# Patient Record
Sex: FEMALE | Race: WHITE | NOT HISPANIC OR LATINO | Employment: OTHER | URBAN - METROPOLITAN AREA
[De-identification: names, ages, dates, MRNs, and addresses within clinical notes are randomized per-mention and may not be internally consistent; named-entity substitution may affect disease eponyms.]

---

## 2021-01-28 ENCOUNTER — IMMUNIZATIONS (OUTPATIENT)
Dept: FAMILY MEDICINE CLINIC | Facility: HOSPITAL | Age: 86
End: 2021-01-28

## 2021-01-28 DIAGNOSIS — Z23 ENCOUNTER FOR IMMUNIZATION: Primary | ICD-10-CM

## 2021-01-28 PROCEDURE — 0011A SARS-COV-2 / COVID-19 MRNA VACCINE (MODERNA) 100 MCG: CPT

## 2021-01-28 PROCEDURE — 91301 SARS-COV-2 / COVID-19 MRNA VACCINE (MODERNA) 100 MCG: CPT

## 2021-02-25 ENCOUNTER — IMMUNIZATIONS (OUTPATIENT)
Dept: FAMILY MEDICINE CLINIC | Facility: HOSPITAL | Age: 86
End: 2021-02-25

## 2021-02-25 DIAGNOSIS — Z23 ENCOUNTER FOR IMMUNIZATION: Primary | ICD-10-CM

## 2021-02-25 PROCEDURE — 0012A SARS-COV-2 / COVID-19 MRNA VACCINE (MODERNA) 100 MCG: CPT

## 2021-02-25 PROCEDURE — 91301 SARS-COV-2 / COVID-19 MRNA VACCINE (MODERNA) 100 MCG: CPT

## 2022-01-14 ENCOUNTER — APPOINTMENT (EMERGENCY)
Dept: RADIOLOGY | Facility: HOSPITAL | Age: 87
DRG: 202 | End: 2022-01-14
Payer: COMMERCIAL

## 2022-01-14 ENCOUNTER — HOSPITAL ENCOUNTER (INPATIENT)
Facility: HOSPITAL | Age: 87
LOS: 2 days | Discharge: HOME | DRG: 202 | End: 2022-01-16
Attending: EMERGENCY MEDICINE | Admitting: INTERNAL MEDICINE
Payer: COMMERCIAL

## 2022-01-14 DIAGNOSIS — N39.0 UTI (URINARY TRACT INFECTION): ICD-10-CM

## 2022-01-14 DIAGNOSIS — R05.9 COUGH: Primary | ICD-10-CM

## 2022-01-14 DIAGNOSIS — J40 BRONCHITIS: ICD-10-CM

## 2022-01-14 DIAGNOSIS — R09.02 HYPOXIA: ICD-10-CM

## 2022-01-14 DIAGNOSIS — E87.6 HYPOKALEMIA: ICD-10-CM

## 2022-01-14 PROBLEM — R06.89 ACUTE RESPIRATORY INSUFFICIENCY: Status: ACTIVE | Noted: 2022-01-14

## 2022-01-14 PROBLEM — Z95.0 CARDIAC PACEMAKER IN SITU: Status: ACTIVE | Noted: 2022-01-14

## 2022-01-14 PROBLEM — N30.00 ACUTE CYSTITIS WITHOUT HEMATURIA: Status: ACTIVE | Noted: 2022-01-14

## 2022-01-14 PROBLEM — N30.90 CYSTITIS: Status: ACTIVE | Noted: 2022-01-14

## 2022-01-14 PROBLEM — I48.91 ATRIAL FIBRILLATION (HCC): Status: ACTIVE | Noted: 2022-01-14

## 2022-01-14 LAB
ALBUMIN SERPL BCP-MCNC: 3.3 G/DL (ref 3.5–5)
ALP SERPL-CCNC: 71 U/L (ref 46–116)
ALT SERPL W P-5'-P-CCNC: 32 U/L (ref 12–78)
ANION GAP SERPL CALCULATED.3IONS-SCNC: 10 MMOL/L (ref 4–13)
ANION GAP SERPL CALCULATED.3IONS-SCNC: 10 MMOL/L (ref 4–13)
APTT PPP: 51 SECONDS (ref 23–37)
AST SERPL W P-5'-P-CCNC: 23 U/L (ref 5–45)
ATRIAL RATE: 67 BPM
BACTERIA UR QL AUTO: ABNORMAL /HPF
BASOPHILS # BLD AUTO: 0.04 THOUSANDS/ΜL (ref 0–0.1)
BASOPHILS NFR BLD AUTO: 0 % (ref 0–1)
BILIRUB SERPL-MCNC: 0.6 MG/DL (ref 0.2–1)
BILIRUB UR QL STRIP: NEGATIVE
BUN SERPL-MCNC: 20 MG/DL (ref 5–25)
BUN SERPL-MCNC: 21 MG/DL (ref 5–25)
CALCIUM ALBUM COR SERPL-MCNC: 9.5 MG/DL (ref 8.3–10.1)
CALCIUM SERPL-MCNC: 8.9 MG/DL (ref 8.3–10.1)
CALCIUM SERPL-MCNC: 9 MG/DL (ref 8.3–10.1)
CARDIAC TROPONIN I PNL SERPL HS: 17 NG/L
CHLORIDE SERPL-SCNC: 100 MMOL/L (ref 100–108)
CHLORIDE SERPL-SCNC: 101 MMOL/L (ref 100–108)
CLARITY UR: ABNORMAL
CO2 SERPL-SCNC: 26 MMOL/L (ref 21–32)
CO2 SERPL-SCNC: 27 MMOL/L (ref 21–32)
COLOR UR: YELLOW
CREAT SERPL-MCNC: 1.13 MG/DL (ref 0.6–1.3)
CREAT SERPL-MCNC: 1.22 MG/DL (ref 0.6–1.3)
EOSINOPHIL # BLD AUTO: 0.01 THOUSAND/ΜL (ref 0–0.61)
EOSINOPHIL NFR BLD AUTO: 0 % (ref 0–6)
ERYTHROCYTE [DISTWIDTH] IN BLOOD BY AUTOMATED COUNT: 14.1 % (ref 11.6–15.1)
ERYTHROCYTE [DISTWIDTH] IN BLOOD BY AUTOMATED COUNT: 14.3 % (ref 11.6–15.1)
FLUAV RNA RESP QL NAA+PROBE: NEGATIVE
FLUBV RNA RESP QL NAA+PROBE: NEGATIVE
GFR SERPL CREATININE-BSD FRML MDRD: 39 ML/MIN/1.73SQ M
GFR SERPL CREATININE-BSD FRML MDRD: 43 ML/MIN/1.73SQ M
GLUCOSE SERPL-MCNC: 114 MG/DL (ref 65–140)
GLUCOSE SERPL-MCNC: 133 MG/DL (ref 65–140)
GLUCOSE UR STRIP-MCNC: NEGATIVE MG/DL
HCT VFR BLD AUTO: 37 % (ref 34.8–46.1)
HCT VFR BLD AUTO: 38.3 % (ref 34.8–46.1)
HGB BLD-MCNC: 11.5 G/DL (ref 11.5–15.4)
HGB BLD-MCNC: 12.2 G/DL (ref 11.5–15.4)
HGB UR QL STRIP.AUTO: ABNORMAL
IMM GRANULOCYTES # BLD AUTO: 0.06 THOUSAND/UL (ref 0–0.2)
IMM GRANULOCYTES NFR BLD AUTO: 1 % (ref 0–2)
INR PPP: 3.16 (ref 0.84–1.19)
INR PPP: 3.77 (ref 0.84–1.19)
KETONES UR STRIP-MCNC: NEGATIVE MG/DL
LACTATE SERPL-SCNC: 0.8 MMOL/L (ref 0.5–2)
LEUKOCYTE ESTERASE UR QL STRIP: ABNORMAL
LIPASE SERPL-CCNC: 124 U/L (ref 73–393)
LYMPHOCYTES # BLD AUTO: 0.85 THOUSANDS/ΜL (ref 0.6–4.47)
LYMPHOCYTES NFR BLD AUTO: 6 % (ref 14–44)
MCH RBC QN AUTO: 30.5 PG (ref 26.8–34.3)
MCH RBC QN AUTO: 30.6 PG (ref 26.8–34.3)
MCHC RBC AUTO-ENTMCNC: 31.1 G/DL (ref 31.4–37.4)
MCHC RBC AUTO-ENTMCNC: 31.9 G/DL (ref 31.4–37.4)
MCV RBC AUTO: 96 FL (ref 82–98)
MCV RBC AUTO: 98 FL (ref 82–98)
MONOCYTES # BLD AUTO: 0.97 THOUSAND/ΜL (ref 0.17–1.22)
MONOCYTES NFR BLD AUTO: 7 % (ref 4–12)
NEUTROPHILS # BLD AUTO: 11.3 THOUSANDS/ΜL (ref 1.85–7.62)
NEUTS SEG NFR BLD AUTO: 86 % (ref 43–75)
NITRITE UR QL STRIP: POSITIVE
NON-SQ EPI CELLS URNS QL MICRO: ABNORMAL /HPF
NRBC BLD AUTO-RTO: 0 /100 WBCS
NT-PROBNP SERPL-MCNC: 906 PG/ML
P AXIS: 48 DEGREES
PH UR STRIP.AUTO: 6 [PH]
PLATELET # BLD AUTO: 238 THOUSANDS/UL (ref 149–390)
PLATELET # BLD AUTO: 242 THOUSANDS/UL (ref 149–390)
PMV BLD AUTO: 9.3 FL (ref 8.9–12.7)
PMV BLD AUTO: 9.5 FL (ref 8.9–12.7)
POTASSIUM SERPL-SCNC: 3.1 MMOL/L (ref 3.5–5.3)
POTASSIUM SERPL-SCNC: 3.1 MMOL/L (ref 3.5–5.3)
PR INTERVAL: 206 MS
PROCALCITONIN SERPL-MCNC: 0.21 NG/ML
PROT SERPL-MCNC: 7.1 G/DL (ref 6.4–8.2)
PROT UR STRIP-MCNC: NEGATIVE MG/DL
PROTHROMBIN TIME: 31.5 SECONDS (ref 11.6–14.5)
PROTHROMBIN TIME: 36.1 SECONDS (ref 11.6–14.5)
QRS AXIS: -54 DEGREES
QRSD INTERVAL: 132 MS
QT INTERVAL: 436 MS
QTC INTERVAL: 460 MS
RBC # BLD AUTO: 3.76 MILLION/UL (ref 3.81–5.12)
RBC # BLD AUTO: 4 MILLION/UL (ref 3.81–5.12)
RBC #/AREA URNS AUTO: ABNORMAL /HPF
RSV RNA RESP QL NAA+PROBE: NEGATIVE
SARS-COV-2 RNA RESP QL NAA+PROBE: NEGATIVE
SODIUM SERPL-SCNC: 137 MMOL/L (ref 136–145)
SODIUM SERPL-SCNC: 137 MMOL/L (ref 136–145)
SP GR UR STRIP.AUTO: 1.02 (ref 1–1.03)
T WAVE AXIS: 16 DEGREES
UROBILINOGEN UR QL STRIP.AUTO: 0.2 E.U./DL
VENTRICULAR RATE: 67 BPM
WBC # BLD AUTO: 12.41 THOUSAND/UL (ref 4.31–10.16)
WBC # BLD AUTO: 13.23 THOUSAND/UL (ref 4.31–10.16)
WBC #/AREA URNS AUTO: ABNORMAL /HPF

## 2022-01-14 PROCEDURE — 85610 PROTHROMBIN TIME: CPT | Performed by: PHYSICIAN ASSISTANT

## 2022-01-14 PROCEDURE — 36415 COLL VENOUS BLD VENIPUNCTURE: CPT | Performed by: EMERGENCY MEDICINE

## 2022-01-14 PROCEDURE — 99223 1ST HOSP IP/OBS HIGH 75: CPT | Performed by: STUDENT IN AN ORGANIZED HEALTH CARE EDUCATION/TRAINING PROGRAM

## 2022-01-14 PROCEDURE — 94760 N-INVAS EAR/PLS OXIMETRY 1: CPT

## 2022-01-14 PROCEDURE — 87086 URINE CULTURE/COLONY COUNT: CPT | Performed by: EMERGENCY MEDICINE

## 2022-01-14 PROCEDURE — 93010 ELECTROCARDIOGRAM REPORT: CPT | Performed by: INTERNAL MEDICINE

## 2022-01-14 PROCEDURE — 0241U HB NFCT DS VIR RESP RNA 4 TRGT: CPT | Performed by: EMERGENCY MEDICINE

## 2022-01-14 PROCEDURE — 80053 COMPREHEN METABOLIC PANEL: CPT | Performed by: EMERGENCY MEDICINE

## 2022-01-14 PROCEDURE — 83880 ASSAY OF NATRIURETIC PEPTIDE: CPT | Performed by: EMERGENCY MEDICINE

## 2022-01-14 PROCEDURE — 84484 ASSAY OF TROPONIN QUANT: CPT | Performed by: EMERGENCY MEDICINE

## 2022-01-14 PROCEDURE — 87040 BLOOD CULTURE FOR BACTERIA: CPT | Performed by: EMERGENCY MEDICINE

## 2022-01-14 PROCEDURE — 71045 X-RAY EXAM CHEST 1 VIEW: CPT

## 2022-01-14 PROCEDURE — 99285 EMERGENCY DEPT VISIT HI MDM: CPT | Performed by: EMERGENCY MEDICINE

## 2022-01-14 PROCEDURE — 81001 URINALYSIS AUTO W/SCOPE: CPT | Performed by: EMERGENCY MEDICINE

## 2022-01-14 PROCEDURE — 99285 EMERGENCY DEPT VISIT HI MDM: CPT

## 2022-01-14 PROCEDURE — 83605 ASSAY OF LACTIC ACID: CPT | Performed by: EMERGENCY MEDICINE

## 2022-01-14 PROCEDURE — 93005 ELECTROCARDIOGRAM TRACING: CPT

## 2022-01-14 PROCEDURE — 80048 BASIC METABOLIC PNL TOTAL CA: CPT | Performed by: PHYSICIAN ASSISTANT

## 2022-01-14 PROCEDURE — 83690 ASSAY OF LIPASE: CPT | Performed by: EMERGENCY MEDICINE

## 2022-01-14 PROCEDURE — 85027 COMPLETE CBC AUTOMATED: CPT | Performed by: PHYSICIAN ASSISTANT

## 2022-01-14 PROCEDURE — 85025 COMPLETE CBC W/AUTO DIFF WBC: CPT | Performed by: EMERGENCY MEDICINE

## 2022-01-14 PROCEDURE — 94664 DEMO&/EVAL PT USE INHALER: CPT

## 2022-01-14 PROCEDURE — 85730 THROMBOPLASTIN TIME PARTIAL: CPT | Performed by: EMERGENCY MEDICINE

## 2022-01-14 PROCEDURE — 85610 PROTHROMBIN TIME: CPT | Performed by: EMERGENCY MEDICINE

## 2022-01-14 PROCEDURE — 84145 PROCALCITONIN (PCT): CPT | Performed by: PHYSICIAN ASSISTANT

## 2022-01-14 RX ORDER — SENNOSIDES 8.6 MG
1 TABLET ORAL
Status: DISCONTINUED | OUTPATIENT
Start: 2022-01-14 | End: 2022-01-16 | Stop reason: HOSPADM

## 2022-01-14 RX ORDER — ACETAMINOPHEN 325 MG/1
975 TABLET ORAL ONCE
Status: COMPLETED | OUTPATIENT
Start: 2022-01-14 | End: 2022-01-14

## 2022-01-14 RX ORDER — ONDANSETRON 2 MG/ML
4 INJECTION INTRAMUSCULAR; INTRAVENOUS EVERY 6 HOURS PRN
Status: DISCONTINUED | OUTPATIENT
Start: 2022-01-14 | End: 2022-01-16 | Stop reason: HOSPADM

## 2022-01-14 RX ORDER — CHLORTHALIDONE 25 MG/1
25 TABLET ORAL DAILY
COMMUNITY

## 2022-01-14 RX ORDER — CEFTRIAXONE 1 G/50ML
1000 INJECTION, SOLUTION INTRAVENOUS ONCE
Status: COMPLETED | OUTPATIENT
Start: 2022-01-14 | End: 2022-01-14

## 2022-01-14 RX ORDER — POTASSIUM CHLORIDE 20 MEQ/1
40 TABLET, EXTENDED RELEASE ORAL
Status: DISPENSED | OUTPATIENT
Start: 2022-01-14 | End: 2022-01-15

## 2022-01-14 RX ORDER — LORAZEPAM 1 MG/1
0.5 TABLET ORAL DAILY PRN
COMMUNITY

## 2022-01-14 RX ORDER — LORAZEPAM 0.5 MG/1
0.5 TABLET ORAL DAILY PRN
Status: DISCONTINUED | OUTPATIENT
Start: 2022-01-14 | End: 2022-01-16 | Stop reason: HOSPADM

## 2022-01-14 RX ORDER — WARFARIN SODIUM 5 MG/1
2.5 TABLET ORAL
Status: DISCONTINUED | OUTPATIENT
Start: 2022-01-14 | End: 2022-01-14

## 2022-01-14 RX ORDER — BISOPROLOL FUMARATE 5 MG/1
5 TABLET ORAL DAILY
Status: DISCONTINUED | OUTPATIENT
Start: 2022-01-14 | End: 2022-01-16 | Stop reason: HOSPADM

## 2022-01-14 RX ORDER — CHLORTHALIDONE 25 MG/1
25 TABLET ORAL DAILY
Status: DISCONTINUED | OUTPATIENT
Start: 2022-01-14 | End: 2022-01-16 | Stop reason: HOSPADM

## 2022-01-14 RX ORDER — BENZONATATE 100 MG/1
100 CAPSULE ORAL 3 TIMES DAILY PRN
Status: DISCONTINUED | OUTPATIENT
Start: 2022-01-14 | End: 2022-01-16 | Stop reason: HOSPADM

## 2022-01-14 RX ORDER — ACETAMINOPHEN 325 MG/1
650 TABLET ORAL EVERY 6 HOURS PRN
Status: DISCONTINUED | OUTPATIENT
Start: 2022-01-14 | End: 2022-01-16 | Stop reason: HOSPADM

## 2022-01-14 RX ORDER — CEFTRIAXONE 1 G/50ML
1000 INJECTION, SOLUTION INTRAVENOUS EVERY 24 HOURS
Status: DISCONTINUED | OUTPATIENT
Start: 2022-01-15 | End: 2022-01-15

## 2022-01-14 RX ORDER — MULTIVIT-MIN/IRON/FOLIC ACID/K 18-600-40
500 CAPSULE ORAL DAILY
COMMUNITY

## 2022-01-14 RX ORDER — PRAVASTATIN SODIUM 20 MG
20 TABLET ORAL DAILY
COMMUNITY

## 2022-01-14 RX ORDER — CLOPIDOGREL BISULFATE 75 MG/1
75 TABLET ORAL DAILY
COMMUNITY

## 2022-01-14 RX ORDER — CLOPIDOGREL BISULFATE 75 MG/1
75 TABLET ORAL DAILY
Status: DISCONTINUED | OUTPATIENT
Start: 2022-01-14 | End: 2022-01-16 | Stop reason: HOSPADM

## 2022-01-14 RX ORDER — PRAVASTATIN SODIUM 20 MG
20 TABLET ORAL
Status: DISCONTINUED | OUTPATIENT
Start: 2022-01-14 | End: 2022-01-16 | Stop reason: HOSPADM

## 2022-01-14 RX ORDER — BENZONATATE 100 MG/1
100 CAPSULE ORAL ONCE
Status: COMPLETED | OUTPATIENT
Start: 2022-01-14 | End: 2022-01-14

## 2022-01-14 RX ORDER — AMLODIPINE BESYLATE 5 MG/1
5 TABLET ORAL DAILY
Status: DISCONTINUED | OUTPATIENT
Start: 2022-01-14 | End: 2022-01-16 | Stop reason: HOSPADM

## 2022-01-14 RX ORDER — WARFARIN SODIUM 2.5 MG/1
5 TABLET ORAL
COMMUNITY

## 2022-01-14 RX ORDER — POTASSIUM CHLORIDE 20 MEQ/1
40 TABLET, EXTENDED RELEASE ORAL ONCE
Status: COMPLETED | OUTPATIENT
Start: 2022-01-14 | End: 2022-01-14

## 2022-01-14 RX ORDER — AMLODIPINE BESYLATE 5 MG/1
5 TABLET ORAL DAILY
COMMUNITY

## 2022-01-14 RX ORDER — CALCIUM CARBONATE 200(500)MG
1000 TABLET,CHEWABLE ORAL DAILY PRN
Status: DISCONTINUED | OUTPATIENT
Start: 2022-01-14 | End: 2022-01-16 | Stop reason: HOSPADM

## 2022-01-14 RX ADMIN — CEFTRIAXONE 1000 MG: 1 INJECTION, SOLUTION INTRAVENOUS at 02:37

## 2022-01-14 RX ADMIN — BENZONATATE 100 MG: 100 CAPSULE ORAL at 01:01

## 2022-01-14 RX ADMIN — LORAZEPAM 0.5 MG: 0.5 TABLET ORAL at 09:30

## 2022-01-14 RX ADMIN — CHLORTHALIDONE 25 MG: 25 TABLET ORAL at 09:30

## 2022-01-14 RX ADMIN — BISOPROLOL FUMARATE 5 MG: 5 TABLET ORAL at 09:30

## 2022-01-14 RX ADMIN — POTASSIUM CHLORIDE 40 MEQ: 1500 TABLET, EXTENDED RELEASE ORAL at 01:28

## 2022-01-14 RX ADMIN — CLOPIDOGREL BISULFATE 75 MG: 75 TABLET ORAL at 09:28

## 2022-01-14 RX ADMIN — ACETAMINOPHEN 975 MG: 325 TABLET, FILM COATED ORAL at 01:01

## 2022-01-14 RX ADMIN — AMLODIPINE BESYLATE 5 MG: 5 TABLET ORAL at 09:30

## 2022-01-14 RX ADMIN — CALCIUM CARBONATE (ANTACID) CHEW TAB 500 MG 1000 MG: 500 CHEW TAB at 09:28

## 2022-01-14 RX ADMIN — PRAVASTATIN SODIUM 20 MG: 20 TABLET ORAL at 17:38

## 2022-01-14 RX ADMIN — POTASSIUM CHLORIDE 40 MEQ: 1500 TABLET, EXTENDED RELEASE ORAL at 16:52

## 2022-01-14 RX ADMIN — POTASSIUM CHLORIDE 40 MEQ: 1500 TABLET, EXTENDED RELEASE ORAL at 09:29

## 2022-01-14 NOTE — H&P
[facility] 1933, 80 [facility]  [facility] MRN: [id number]  Unit/Bed#: ED 08 Encounter: [id number]  Primary Care Provider: Duane Barry, MD   Date and time admitted to hospital: 1/14/2022 12:02 AM    * Cystitis  Assessment & Plan  · Reports burning after urination   · UA: 30-50 WBCs, moderate bacteria, and positive nitrite   · Urine culture, pending   · Febrile to 100 8F in ED with leukocytosis at 13  23K  · Continue ceftriaxone     Acute respiratory insufficiency  Assessment & Plan  · SpO2 on RA 88-90%   · SpO2 improve to 93-97% on 2L NC  · Turned down to 0 5L on admission with SpO2 of 92%   · COVID 19 PCR negative  · Wean as able   · Likely secondary to acute bronchitis     Bronchitis  Assessment & Plan  · Worsening cough over the last 2 weeks   · Treated with azithromycin with last dose on Monday with mild improvement in cough   · CXR without infiltrate on my review, final read pending   · Continue tessalon perles TID     Hypertension  Assessment & Plan  · Home regimen: amlodipine 5mg daily, chlorthalidone 25mg daily, and bisoprolol 10mg daily   · Continue     Cardiac pacemaker in situ  Assessment & Plan  · Placed in September 2021 at [facility]     Atrial fibrillation [facility])  Assessment & Plan  · Home regimen: bisoprolol 5 mg daily and anticoagulation with coumadin 5mg daily except Monday 2 5mg   · INR 3 77 this AM - will hold coumadin this evening     VTE Pharmacologic Prophylaxis: VTE Score: 4 Moderate Risk (Score 3-4) - Pharmacological DVT Prophylaxis Ordered: heparin  Code Status: Level 1 - Full Code   Discussion with family: Updated  (daughter) via phone  Anticipated Length of Stay: Patient will be admitted on an inpatient basis with an anticipated length of stay of greater than 2 midnights secondary to Acute respiratory insufficiency, bronchitis      Total Time for Visit, including Counseling / Coordination of Care: 45 minutes Greater than 50% of this total time spent on direct patient counseling and coordination of care  Chief Complaint: "I have this terrible cough"    History of Present Illness:  Sneha Shukla is a 80 y o  female with a PMH of atrial fibrillation on Coumadin, pacemaker placement, HTN, and recent pneumonia in November 2021 who presents with worsening cough x2 weeks  Reports treatment with azithromycin for pneumonia with improvement in symptoms  Two weeks ago she developed a productive cough of yellow phlegm  She was again treated with azithromycin with last dose 4 days ago with mild improvement in cough  No intake of cough suppressants  Reports some chills at home, but no fever  Denies dyspnea, abdominal pain, nausea, vomiting, or change in bowel habits  She does endorse burning after urination for a couple of days this last week  Daughter reports change in mental status yesterday, described as using a tissue as a tv remote  Review of Systems:  Review of Systems   Constitutional: Positive for chills  Negative for fever  HENT: Negative for congestion and sore throat  Respiratory: Positive for cough  Negative for shortness of breath  Cardiovascular: Negative for chest pain, palpitations and leg swelling  Gastrointestinal: Negative for abdominal pain, constipation, diarrhea, nausea and vomiting  Genitourinary: Positive for dysuria  Negative for difficulty urinating and hematuria  Musculoskeletal: Positive for gait problem (uses cane)  Neurological: Negative for weakness and numbness  All other systems reviewed and are negative        Past Medical and Surgical History:   Past Medical History:   Diagnosis Date    Ambulatory dysfunction 1/25/2016    Anxiety     Arthritis     Basal cell carcinoma     BBB (bundle branch block)     Hyperlipidemia     Hypertension     Hypoglycemia     Hypoglycemia     Right knee pain        Past Surgical History:   Procedure Laterality Date    BOWEL RESECTION  CHOLECYSTECTOMY      HYSTERECTOMY      RESECTION SMALL BOWEL / CLOSURE ILEOSTOMY         Meds/Allergies:  Prior to Admission medications    Medication Sig Start Date End Date Taking? Authorizing Provider   acetaminophen (TYLENOL) 325 mg tablet Take 2 tablets (650 mg total) by mouth 3 (three) times a day as needed for mild pain  1/25/16   Rajan Tsang MD   bisoprolol (ZEBETA) 10 MG tablet Take 10 mg by mouth daily Indications: High Blood Pressure  Historical Provider, MD   lisinopril (ZESTRIL) 5 mg tablet Take 5 mg by mouth daily  Historical Provider, MD LOZANO have reviewed home medications with patient personally  Allergies: Allergies   Allergen Reactions    Codeine     Penicillins     Streptomycin        Social History:  Marital Status:    Occupation: retired   Patient Pre-hospital Living Situation: Home, With other family member: daughter  Patient Pre-hospital Level of Mobility: walks with cane  Patient Pre-hospital Diet Restrictions: none   Substance Use History:   Social History     Substance and Sexual Activity   Alcohol Use No     Social History     Tobacco Use   Smoking Status Former Smoker   Smokeless Tobacco Never Used     Social History     Substance and Sexual Activity   Drug Use No       Family History:  Family History   Problem Relation Age of Onset    Stroke Mother        Physical Exam:     Vitals:   Blood Pressure: 110/52 (01/14/22 0500)  Pulse: 60 (01/14/22 0500)  Temperature: 98 3 °F (36 8 °C) (01/14/22 0432)  Temp Source: Oral (01/14/22 0432)  Respirations: 20 (01/14/22 0500)  Height: 5' 6" (167 6 cm) (01/14/22 0010)  Weight - Scale: 69 9 kg (154 lb) (01/14/22 0010)  SpO2: 92 % (01/14/22 0500)    Physical Exam  Vitals and nursing note reviewed  Constitutional:       Appearance: Normal appearance  HENT:      Head: Normocephalic        Nose: Nose normal       Mouth/Throat:      Mouth: Mucous membranes are moist    Eyes:      Extraocular Movements: Extraocular movements intact  Conjunctiva/sclera: Conjunctivae normal    Cardiovascular:      Rate and Rhythm: Normal rate and regular rhythm  Pulses: Normal pulses  Heart sounds: No murmur heard  Pulmonary:      Effort: Pulmonary effort is normal       Breath sounds: Normal breath sounds  Comments: Wet sounding productive cough  Abdominal:      General: Abdomen is flat  Palpations: Abdomen is soft  Tenderness: There is no abdominal tenderness  There is no guarding or rebound  Musculoskeletal:         General: Normal range of motion  Cervical back: Normal range of motion  Right lower leg: No edema  Left lower leg: No edema  Skin:     General: Skin is warm and dry  Neurological:      General: No focal deficit present  Mental Status: She is alert  Comments: Oriented to self, place, month (after prompting), year, and president  Psychiatric:         Mood and Affect: Mood normal          Thought Content:  Thought content normal           Additional Data:     Lab Results:  Results from last 7 days   Lab Units 01/14/22  0044   WBC Thousand/uL 13 23*   HEMOGLOBIN g/dL 12 2   HEMATOCRIT % 38 3   PLATELETS Thousands/uL 242   NEUTROS PCT % 86*   LYMPHS PCT % 6*   MONOS PCT % 7   EOS PCT % 0     Results from last 7 days   Lab Units 01/14/22  0044   SODIUM mmol/L 137   POTASSIUM mmol/L 3 1*   CHLORIDE mmol/L 100   CO2 mmol/L 27   BUN mg/dL 20   CREATININE mg/dL 1 13   ANION GAP mmol/L 10   CALCIUM mg/dL 8 9   ALBUMIN g/dL 3 3*   TOTAL BILIRUBIN mg/dL 0 60   ALK PHOS U/L 71   ALT U/L 32   AST U/L 23   GLUCOSE RANDOM mg/dL 133     Results from last 7 days   Lab Units 01/14/22  0044   INR  3 16*             Results from last 7 days   Lab Units 01/14/22  0231   LACTIC ACID mmol/L 0 8       Imaging: Personally reviewed the following imaging: chest xray  XR chest portable   ED Interpretation by King Goodson DO (01/14 0144)   No lobar infiltrate          EKG and Other Studies Reviewed on Admission:   · EKG: NSR  HR 67 bpm   Normal QT interval   Right bundle-branch block  LVH  No acute signs of ischemia       ** Please Note: This note has been constructed using a voice recognition system   **

## 2022-01-14 NOTE — ASSESSMENT & PLAN NOTE
· Worsening cough over the last 2 weeks   · Treated with azithromycin with last dose on Monday with mild improvement in cough   · CXR no acute cardiopulmonary disease  · Continue tessalon perles TID

## 2022-01-14 NOTE — ASSESSMENT & PLAN NOTE
· SpO2 on RA 88-90%   · SpO2 improve to 93-97% on 2L NC-> wean down to room air  · COVID 19 PCR negative  · Likely secondary to acute bronchitis   · Resp protocol  · Incentive spirometry  · Acapella

## 2022-01-14 NOTE — Clinical Note
Case was discussed with EMERALD and the patient's admission status was agreed to be Admission Status: inpatient status to the service of Dr Darren Ko

## 2022-01-14 NOTE — ASSESSMENT & PLAN NOTE
· Reports burning after urination   · UA: 30-50 WBCs, moderate bacteria, and positive nitrite   · Urine culture, pending   · Febrile to 100 8F in ED with leukocytosis at 13  23K  · Continue ceftriaxone

## 2022-01-14 NOTE — RESPIRATORY THERAPY NOTE
RT Protocol Note  Reed Fung 80 y o  female MRN: [id number]  Unit/Bed#: -01 Encounter: [id number]    Assessment    Principal Problem:    Acute cystitis without hematuria  Active Problems:    Hypertension    Bronchitis    Cardiac pacemaker in situ    Atrial fibrillation ([address] )    Acute respiratory insufficiency      Home Pulmonary Medications:  none       Past Medical History:   Diagnosis Date    Ambulatory dysfunction 1/25/2016    Anxiety     Arthritis     Basal cell carcinoma     BBB (bundle branch block)     Hyperlipidemia     Hypertension     Hypoglycemia     Hypoglycemia     Right knee pain      Social History     Socioeconomic History    Marital status:      Spouse name: None    Number of children: None    Years of education: None    Highest education level: None   Occupational History    None   Tobacco Use    Smoking status: Former Smoker    Smokeless tobacco: Never Used   Vaping Use    Vaping Use: Never used   Substance and Sexual Activity    Alcohol use: No    Drug use: No    Sexual activity: None   Other Topics Concern    None   Social History Narrative    None     Social Determinants of Health     Financial Resource Strain: Not on file   Food Insecurity: Not on file   Transportation Needs: Not on file   Physical Activity: Not on file   Stress: Not on file   Social Connections: Not on file   Intimate Partner Violence: Not on file   Housing Stability: Not on file       Subjective         Objective    Physical Exam:   Assessment Type: Assess only  General Appearance: Alert,Awake  Respiratory Pattern: Normal  Chest Assessment: Chest expansion symmetrical  Bilateral Breath Sounds: Diminished,Coarse  Cough: Non-productive    Vitals:  Blood pressure 129/56, pulse 60, temperature 98 9 °F (37 2 °C), temperature source Oral, resp  rate 20, height 5' 6" (1 676 m), weight 69 9 kg (154 lb), SpO2 93 %, not currently breastfeeding            Imaging and other studies: I have personally reviewed pertinent reports              Plan    Respiratory Plan: No distress/Pulmonary history        Resp Comments: D/C protocol

## 2022-01-14 NOTE — ASSESSMENT & PLAN NOTE
· Home regimen: bisoprolol 5 mg daily and anticoagulation with coumadin 5mg daily except Monday 2 5mg   · INR 3 84 this AM - hold coumadin today

## 2022-01-14 NOTE — PLAN OF CARE
Problem: Potential for Falls  Goal: Patient will remain free of falls  Description: INTERVENTIONS:  - Educate patient/family on patient safety including physical limitations  - Instruct patient to call for assistance with activity   - Consult OT/PT to assist with strengthening/mobility   - Keep Call bell within reach  - Keep bed low and locked with side rails adjusted as appropriate  - Keep care items and personal belongings within reach  - Initiate and maintain comfort rounds  - Make Fall Risk Sign visible to staff  - Offer Toileting every 1 Hour plus pur wick  - Initiate/Maintain N/A alarm  - Obtain necessary fall risk management equipment: cane   - Apply yellow socks and bracelet for high fall risk patients  - Consider moving patient to room near nurses station  Outcome: Progressing

## 2022-01-14 NOTE — ASSESSMENT & PLAN NOTE
· Reports burning after urination   · UA: 30-50 WBCs, moderate bacteria, and positive nitrite   · Urine culture <100,00  · Febrile to 100 8F in ED with leukocytosis at 13  23K  · ceftriaxone day 3

## 2022-01-14 NOTE — ASSESSMENT & PLAN NOTE
· Home regimen: bisoprolol 5 mg daily and anticoagulation with coumadin 5mg daily except Monday 2 5mg   · INR 3 16 on admission - will dose coumadin 2 5mg this evening

## 2022-01-14 NOTE — ASSESSMENT & PLAN NOTE
· Home regimen: amlodipine 5mg daily, chlorthalidone 25mg daily, and bisoprolol 10mg daily   · Continue

## 2022-01-14 NOTE — ASSESSMENT & PLAN NOTE
· Worsening cough over the last 2 weeks   · Treated with azithromycin with last dose on Monday with mild improvement in cough   · CXR without infiltrate on my review, final read pending   · Continue tessalon perles TID

## 2022-01-14 NOTE — ASSESSMENT & PLAN NOTE
· SpO2 on RA 88-90%   · SpO2 improve to 93-97% on 2L NC  · Turned down to 0 5L on admission with SpO2 of 92%   · COVID 19 PCR negative  · Wean as able   · Likely secondary to acute bronchitis

## 2022-01-14 NOTE — ED PROVIDER NOTES
History  Chief Complaint   Patient presents with    Cough     Pt arrived to ED via EMS, has cough since November  31-year-old female with past medical history of hypertension, hyperlipidemia presents for evaluation of continued cough for the last 2 weeks, it is nonproductive she states she has also been feeling unwell which is why she called the ambulance today  She states that the cough is worse when she is laying down at night, denies any orthopnea denies any lower extremity swelling  On EMS arrival patient was saturating 90% on room air improved to 94% on 4 L   of note patient was admitted for pneumonia in November since then has had persistent cough for which she received 2 Z PACs without any improvement, states that she did not take any antitussives  Does state that her daughter has been sick this week but tested negative for COVID she also had a rapid test herself earlier today which was negative  She is vaccinated with 2 doses of Abbi Woodman last January and February but appears to not have gotten the booster  Prior to Admission Medications   Prescriptions Last Dose Informant Patient Reported? Taking? Ascorbic Acid (Vitamin C) 500 MG CAPS 1/13/2022 at Unknown time  Yes Yes   Sig: Take 500 mg by mouth in the morning   LORazepam (ATIVAN) 1 mg tablet 1/13/2022 at Unknown time  Yes Yes   Sig: Take 0 5 mg by mouth daily as needed for anxiety   acetaminophen (TYLENOL) 325 mg tablet Past Month at Unknown time  No Yes   Sig: Take 2 tablets (650 mg total) by mouth 3 (three) times a day as needed for mild pain     amLODIPine (NORVASC) 5 mg tablet Past Week at Unknown time  Yes Yes   Sig: Take 5 mg by mouth daily   bisoprolol (ZEBETA) 10 MG tablet 1/13/2022 at Unknown time Self Yes Yes   Sig: Take 5 mg by mouth daily     chlorthalidone 25 mg tablet 1/13/2022 at Unknown time  Yes Yes   Sig: Take 25 mg by mouth daily   clopidogrel (PLAVIX) 75 mg tablet 1/13/2022 at Unknown time  Yes Yes   Sig: Take 75 mg by mouth daily   pravastatin (PRAVACHOL) 20 mg tablet 1/13/2022 at Unknown time  Yes Yes   Sig: Take 20 mg by mouth daily   warfarin (COUMADIN) 2 5 mg tablet 1/13/2022 at Unknown time  Yes Yes   Sig: Take 5 mg by mouth daily 2 5 mg on Monday      Facility-Administered Medications: None       Past Medical History:   Diagnosis Date    Ambulatory dysfunction 1/25/2016    Anxiety     Arthritis     Basal cell carcinoma     BBB (bundle branch block)     Hyperlipidemia     Hypertension     Hypoglycemia     Hypoglycemia     Right knee pain        Past Surgical History:   Procedure Laterality Date    BOWEL RESECTION      CHOLECYSTECTOMY      HYSTERECTOMY      RESECTION SMALL BOWEL / CLOSURE ILEOSTOMY         Family History   Problem Relation Age of Onset    Stroke Mother      I have reviewed and agree with the history as documented  E-Cigarette/Vaping    E-Cigarette Use Never User      E-Cigarette/Vaping Substances    Nicotine No     THC No     CBD No     Flavoring No     Other No     Unknown No      Social History     Tobacco Use    Smoking status: Former Smoker    Smokeless tobacco: Never Used   Vaping Use    Vaping Use: Never used   Substance Use Topics    Alcohol use: No    Drug use: No       Review of Systems   Constitutional: Positive for fatigue and fever  Negative for appetite change  HENT: Negative for rhinorrhea and sore throat  Eyes: Negative for photophobia and visual disturbance  Respiratory: Positive for cough  Negative for chest tightness and wheezing  Cardiovascular: Negative for chest pain, palpitations and leg swelling  Gastrointestinal: Negative for abdominal distention, abdominal pain, blood in stool, constipation and diarrhea  Genitourinary: Negative for dysuria, flank pain, frequency, hematuria and urgency  Musculoskeletal: Negative for back pain  Skin: Negative for rash  Neurological: Negative for dizziness, weakness and headaches     All other systems reviewed and are negative  Physical Exam  Physical Exam  Vitals and nursing note reviewed  Constitutional:       Appearance: She is well-developed  HENT:      Head: Normocephalic and atraumatic  Nose: Nose normal  No congestion  Mouth/Throat:      Mouth: Mucous membranes are moist    Eyes:      Pupils: Pupils are equal, round, and reactive to light  Cardiovascular:      Rate and Rhythm: Normal rate and regular rhythm  Heart sounds: No murmur heard  No friction rub  No gallop  Pulmonary:      Effort: Pulmonary effort is normal       Breath sounds: Rhonchi present  No wheezing  Comments: Minimal rhonchi bilateral  Chest:      Chest wall: No tenderness  Abdominal:      General: There is no distension  Palpations: Abdomen is soft  There is no mass  Tenderness: There is no abdominal tenderness  There is no guarding or rebound  Musculoskeletal:      Cervical back: Normal range of motion and neck supple  Right lower leg: No edema  Left lower leg: No edema  Skin:     General: Skin is warm and dry  Capillary Refill: Capillary refill takes less than 2 seconds  Neurological:      General: No focal deficit present  Mental Status: She is alert and oriented to person, place, and time           Vital Signs  ED Triage Vitals   Temperature Pulse Respirations Blood Pressure SpO2   01/14/22 0005 01/14/22 0005 01/14/22 0005 01/14/22 0005 01/14/22 0005   (!) 100 8 °F (38 2 °C) 72 20 148/60 97 %      Temp Source Heart Rate Source Patient Position - Orthostatic VS BP Location FiO2 (%)   01/14/22 0005 01/14/22 0005 01/14/22 0130 01/14/22 0130 --   Oral Monitor Sitting Right arm       Pain Score       01/14/22 0100       No Pain           Vitals:    01/14/22 0005 01/14/22 0100 01/14/22 0130   BP: 148/60 123/78 115/60   Pulse: 72 65 62   Patient Position - Orthostatic VS:   Sitting         Visual Acuity      ED Medications  Medications   acetaminophen (TYLENOL) tablet 975 mg (975 mg Oral Given 1/14/22 0101)   benzonatate (TESSALON PERLES) capsule 100 mg (100 mg Oral Given 1/14/22 0101)   potassium chloride (K-DUR,KLOR-CON) CR tablet 40 mEq (40 mEq Oral Given 1/14/22 0128)   cefTRIAXone (ROCEPHIN) IVPB (premix in dextrose) 1,000 mg 50 mL (1,000 mg Intravenous New Bag 1/14/22 0237)       Diagnostic Studies  Results Reviewed     Procedure Component Value Units Date/Time    Lactic acid, plasma [[id number]]  (Normal) Collected: 01/14/22 0231    Lab Status: Final result Specimen: Blood from Arm, Right Updated: 01/14/22 0301     LACTIC ACID 0 8 mmol/L     Narrative:      Result may be elevated if tourniquet was used during collection  Blood culture #1 [[id number]] Collected: 01/14/22 0231    Lab Status: In process Specimen: Blood from Arm, Left Updated: 01/14/22 0237    Blood culture #2 [[id number]] Collected: 01/14/22 0231    Lab Status: In process Specimen: Blood from Arm, Right Updated: 01/14/22 0237    Procalcitonin with AM Reflex [[id number]]     Lab Status: No result Specimen: Blood     UA w Reflex to Microscopic w Reflex to Culture [[id number]]     Lab Status: No result Specimen: Urine     COVID/FLU/RSV [[id number]]  (Normal) Collected: 01/14/22 0044    Lab Status: Final result Specimen: Nares from Nose Updated: 01/14/22 0135     SARS-CoV-2 Negative     INFLUENZA A PCR Negative     INFLUENZA B PCR Negative     RSV PCR Negative    Narrative:      FOR PEDIATRIC PATIENTS - copy/paste COVID Guidelines URL to browser: [email or web address]  [email or web address]    SARS-CoV-2 assay is a Nucleic Acid Amplification assay intended for the  qualitative detection of nucleic acid from SARS-CoV-2 in nasopharyngeal  swabs  Results are for the presumptive identification of SARS-CoV-2 RNA  Positive results are indicative of infection with SARS-CoV-2, the virus  causing COVID-19, but do not rule out bacterial infection or co-infection  with other viruses  Laboratories within the [place] and its  territories are required to report all positive results to the appropriate  public health authorities  Negative results do not preclude SARS-CoV-2  infection and should not be used as the sole basis for treatment or other  patient management decisions  Negative results must be combined with  clinical observations, patient history, and epidemiological information  This test has not been FDA cleared or approved  This test has been authorized by FDA under an Emergency Use Authorization  (EUA)  This test is only authorized for the duration of time the  declaration that circumstances exist justifying the authorization of the  emergency use of an in vitro diagnostic tests for detection of SARS-CoV-2  virus and/or diagnosis of COVID-19 infection under section 564(b)(1) of  the Act, 21 U  S C  094OWP-6(L)(4), unless the authorization is terminated  or revoked sooner  The test has been validated but independent review by FDA  and CLIA is pending  Test performed using [employer] Gene[employer]pert: This RT-PCR assay targets N2,  a region unique to SARS-CoV-2  A conserved region in the E-gene was chosen  for pan-Sarbecovirus detection which includes SARS-CoV-2      Protime-INR [[id number]]  (Abnormal) Collected: 01/14/22 0044    Lab Status: Final result Specimen: Blood from Arm, Right Updated: 01/14/22 0125     Protime 31 5 seconds      INR 3 16    APTT [[id number]]  (Abnormal) Collected: 01/14/22 0044    Lab Status: Final result Specimen: Blood from Arm, Right Updated: 01/14/22 0125     PTT 51 seconds     HS Troponin 0hr (reflex protocol) [[id number]]  (Normal) Collected: 01/14/22 0044    Lab Status: Final result Specimen: Blood from Arm, Right Updated: 01/14/22 0124     hs TnI 0hr 17 ng/L     Lipase [[id number]]  (Normal) Collected: 01/14/22 0044    Lab Status: Final result Specimen: Blood from Arm, Right Updated: 01/14/22 0123     Lipase 124 u/L     NT-BNP PRO [[id number]]  (Abnormal) Collected: 01/14/22 0044    Lab Status: Final result Specimen: Blood from Arm, Right Updated: 01/14/22 0123     NT-proBNP 906 pg/mL     Comprehensive metabolic panel [[id number]]  (Abnormal) Collected: 01/14/22 0044    Lab Status: Final result Specimen: Blood from Arm, Right Updated: 01/14/22 0116     Sodium 137 mmol/L      Potassium 3 1 mmol/L      Chloride 100 mmol/L      CO2 27 mmol/L      ANION GAP 10 mmol/L      BUN 20 mg/dL      Creatinine 1 13 mg/dL      Glucose 133 mg/dL      Calcium 8 9 mg/dL      Corrected Calcium 9 5 mg/dL      AST 23 U/L      ALT 32 U/L      Alkaline Phosphatase 71 U/L      Total Protein 7 1 g/dL      Albumin 3 3 g/dL      Total Bilirubin 0 60 mg/dL      eGFR 43 ml/min/1 73sq m     Narrative:      [place] guidelines for Chronic Kidney Disease (CKD):     Stage 1 with normal or high GFR (GFR > 90 mL/min/1 73 square meters)    Stage 2 Mild CKD (GFR = 60-89 mL/min/1 73 square meters)    Stage 3A Moderate CKD (GFR = 45-59 mL/min/1 73 square meters)    Stage 3B Moderate CKD (GFR = 30-44 mL/min/1 73 square meters)    Stage 4 Severe CKD (GFR = 15-29 mL/min/1 73 square meters)    Stage 5 End Stage CKD (GFR <15 mL/min/1 73 square meters)  Note: GFR calculation is accurate only with a steady state creatinine    CBC and differential [[id number]]  (Abnormal) Collected: 01/14/22 0044    Lab Status: Final result Specimen: Blood from Arm, Right Updated: 01/14/22 0059     WBC 13 23 Thousand/uL      RBC 4 00 Million/uL      Hemoglobin 12 2 g/dL      Hematocrit 38 3 %      MCV 96 fL      MCH 30 5 pg      MCHC 31 9 g/dL      RDW 14 1 %      MPV 9 3 fL      Platelets 029 Thousands/uL      nRBC 0 /100 WBCs      Neutrophils Relative 86 %      Immat GRANS % 1 %      Lymphocytes Relative 6 %      Monocytes Relative 7 %      Eosinophils Relative 0 %      Basophils Relative 0 %      Neutrophils Absolute 11 30 Thousands/µL      Immature Grans Absolute 0 06 Thousand/uL      Lymphocytes Absolute 0 85 Thousands/µL      Monocytes Absolute 0 97 Thousand/µL      Eosinophils Absolute 0 01 Thousand/µL      Basophils Absolute 0 04 Thousands/µL                  XR chest portable   ED Interpretation by Chadwick Shukla DO (01/14 0144)   No lobar infiltrate                 Procedures  Procedures         ED Course  ED Course as of 01/14/22 0327   Fri Jan 14, 2022   0111 Procedure Note: EKG  Date/Time: 01/14/22 1:11 AM   Performed by: Severo Nay  Authorized by: Severo Nay  Indications / Diagnosis: CP  ECG reviewed by me, the ED Provider: yes   The EKG demonstrates:  Rhythm: normal sinus  Intervals: normal intervals  Axis: normal axis  QRS/Blocks: normal QRS  ST Changes: No acute ST Changes, no STD/POLLO        0140 Last echo in 2016 with EF of 65%   0151 On re-evaluation patient feels better from the coughing standpoint however she still saturates 90% on room air improved to 93% with 2 L nasal cannula, will admit for further care                            Initial Sepsis Screening     [address] Name 01/14/22 0214                Is the patient's history suggestive of a new or worsening infection? Yes (Proceed)  -EB        Suspected source of infection pneumonia  -EB        Are two or more of the following signs & symptoms of infection both present and new to the patient? No  -EB        Indicate SIRS criteria --        If the answer is yes to both questions, suspicion of sepsis is present --        If severe sepsis is present AND tissue hypoperfusion perists in the hour after fluid resuscitation or lactate > 4, the patient meets criteria for SEPTIC SHOCK --        Are any of the following organ dysfunction criteria present within 6 hours of suspected infection and SIRS criteria that are NOT considered to be chronic conditions?  --        Organ dysfunction --        Date of presentation of severe sepsis --        Time of presentation of severe sepsis --        Tissue hypoperfusion persists in the hour after crystalloid fluid administration, evidenced, by either: --        Was hypotension present within one hour of the conclusion of crystalloid fluid administration? --        Date of presentation of septic shock --        Time of presentation of septic shock --              User Key  (r) = Recorded By, (t) = Taken By, (c) = Cosigned By    [address] Name Provider Type    ION Gamez DO Physician                SBIRT 20yo+      Most Recent Value   SBIRT (25 yo +)    In order to provide better care to our patients, we are screening all of our patients for alcohol and drug use  Would it be okay to ask you these screening questions? Yes Filed at: 01/14/2022 0111   Initial Alcohol Screen: US AUDIT-C     1  How often do you have a drink containing alcohol? 0 Filed at: 01/14/2022 0111   2  How many drinks containing alcohol do you have on a typical day you are drinking? 0 Filed at: 01/14/2022 0111   3b  FEMALE Any Age, or MALE 65+: How often do you have 4 or more drinks on one occassion? 0 Filed at: 01/14/2022 0111   Audit-C Score 0 Filed at: 01/14/2022 0111   KENNETH: How many times in the past year have you    Used an illegal drug or used a prescription medication for non-medical reasons?  Never Filed at: 01/14/2022 0111                    MDM  Number of Diagnoses or Management Options  Diagnosis management comments: 49-year-old female presents for evaluation of cough, febrile and hypoxic initially, but no chest pain, no reported shortness of breath, will obtain lab work will obtain imaging and re-evaluate      Disposition  Final diagnoses:   Cough   Bronchitis   Hypoxia   Hypokalemia     Time reflects when diagnosis was documented in both MDM as applicable and the Disposition within this note     Time User Action Codes Description Comment    1/14/2022  1:54 AM Ciara Mtz Add [R05 9] Cough     1/14/2022  1:54 AM Ciara Mtz Add [R65 10] [facility]     1/14/2022  1:54 AM Ciara Mtz Add Nate Brash Bronchitis     1/14/2022  1:54 AM Dellis Simmering Add [R09 02] Hypoxia     1/14/2022  1:58 AM Dellis Simmering Add [E87 6] Hypokalemia     1/14/2022  2:16 AM Dellis Simmering Remove [R65 10] SIRS (systemic inflammatory response syndrome) [facility])       ED Disposition     ED Disposition Condition Date/Time Comment    Admit Stable Fri Jan 14, 2022  2:13 AM Case was discussed with EMERALD and the patient's admission status was agreed to be Admission Status: inpatient status to the service of Dr Axel Milligan  Follow-up Information    None         Patient's Medications   Discharge Prescriptions    No medications on file       No discharge procedures on file      PDMP Review     None          ED Provider  Electronically Signed by           Gale Castañeda DO  01/14/22 0327

## 2022-01-15 LAB
ANION GAP SERPL CALCULATED.3IONS-SCNC: 7 MMOL/L (ref 4–13)
BACTERIA UR CULT: NORMAL
BASOPHILS # BLD AUTO: 0.02 THOUSANDS/ΜL (ref 0–0.1)
BASOPHILS NFR BLD AUTO: 0 % (ref 0–1)
BUN SERPL-MCNC: 16 MG/DL (ref 5–25)
CALCIUM SERPL-MCNC: 9.1 MG/DL (ref 8.3–10.1)
CHLORIDE SERPL-SCNC: 105 MMOL/L (ref 100–108)
CO2 SERPL-SCNC: 28 MMOL/L (ref 21–32)
CREAT SERPL-MCNC: 1.08 MG/DL (ref 0.6–1.3)
EOSINOPHIL # BLD AUTO: 0.08 THOUSAND/ΜL (ref 0–0.61)
EOSINOPHIL NFR BLD AUTO: 1 % (ref 0–6)
ERYTHROCYTE [DISTWIDTH] IN BLOOD BY AUTOMATED COUNT: 14.3 % (ref 11.6–15.1)
GFR SERPL CREATININE-BSD FRML MDRD: 45 ML/MIN/1.73SQ M
GLUCOSE SERPL-MCNC: 83 MG/DL (ref 65–140)
HCT VFR BLD AUTO: 42.3 % (ref 34.8–46.1)
HGB BLD-MCNC: 13.1 G/DL (ref 11.5–15.4)
IMM GRANULOCYTES # BLD AUTO: 0.04 THOUSAND/UL (ref 0–0.2)
IMM GRANULOCYTES NFR BLD AUTO: 1 % (ref 0–2)
INR PPP: 3.84 (ref 0.84–1.19)
LYMPHOCYTES # BLD AUTO: 1.14 THOUSANDS/ΜL (ref 0.6–4.47)
LYMPHOCYTES NFR BLD AUTO: 13 % (ref 14–44)
MAGNESIUM SERPL-MCNC: 1.8 MG/DL (ref 1.6–2.6)
MCH RBC QN AUTO: 30.1 PG (ref 26.8–34.3)
MCHC RBC AUTO-ENTMCNC: 31 G/DL (ref 31.4–37.4)
MCV RBC AUTO: 97 FL (ref 82–98)
MONOCYTES # BLD AUTO: 0.65 THOUSAND/ΜL (ref 0.17–1.22)
MONOCYTES NFR BLD AUTO: 7 % (ref 4–12)
NEUTROPHILS # BLD AUTO: 6.89 THOUSANDS/ΜL (ref 1.85–7.62)
NEUTS SEG NFR BLD AUTO: 78 % (ref 43–75)
NRBC BLD AUTO-RTO: 0 /100 WBCS
PLATELET # BLD AUTO: 248 THOUSANDS/UL (ref 149–390)
PMV BLD AUTO: 9.1 FL (ref 8.9–12.7)
POTASSIUM SERPL-SCNC: 4.1 MMOL/L (ref 3.5–5.3)
PROCALCITONIN SERPL-MCNC: 0.44 NG/ML
PROTHROMBIN TIME: 36.6 SECONDS (ref 11.6–14.5)
RBC # BLD AUTO: 4.35 MILLION/UL (ref 3.81–5.12)
SODIUM SERPL-SCNC: 140 MMOL/L (ref 136–145)
WBC # BLD AUTO: 8.82 THOUSAND/UL (ref 4.31–10.16)

## 2022-01-15 PROCEDURE — 99233 SBSQ HOSP IP/OBS HIGH 50: CPT | Performed by: STUDENT IN AN ORGANIZED HEALTH CARE EDUCATION/TRAINING PROGRAM

## 2022-01-15 PROCEDURE — 83735 ASSAY OF MAGNESIUM: CPT | Performed by: STUDENT IN AN ORGANIZED HEALTH CARE EDUCATION/TRAINING PROGRAM

## 2022-01-15 PROCEDURE — 85610 PROTHROMBIN TIME: CPT | Performed by: PHYSICIAN ASSISTANT

## 2022-01-15 PROCEDURE — 80048 BASIC METABOLIC PNL TOTAL CA: CPT | Performed by: STUDENT IN AN ORGANIZED HEALTH CARE EDUCATION/TRAINING PROGRAM

## 2022-01-15 PROCEDURE — 84145 PROCALCITONIN (PCT): CPT | Performed by: PHYSICIAN ASSISTANT

## 2022-01-15 PROCEDURE — 85025 COMPLETE CBC W/AUTO DIFF WBC: CPT | Performed by: STUDENT IN AN ORGANIZED HEALTH CARE EDUCATION/TRAINING PROGRAM

## 2022-01-15 RX ORDER — CEFPODOXIME PROXETIL 200 MG/1
200 TABLET, FILM COATED ORAL 2 TIMES DAILY WITH MEALS
Status: DISCONTINUED | OUTPATIENT
Start: 2022-01-15 | End: 2022-01-15

## 2022-01-15 RX ORDER — CEFTRIAXONE 1 G/50ML
1000 INJECTION, SOLUTION INTRAVENOUS EVERY 24 HOURS
Status: DISCONTINUED | OUTPATIENT
Start: 2022-01-15 | End: 2022-01-16 | Stop reason: HOSPADM

## 2022-01-15 RX ADMIN — PRAVASTATIN SODIUM 20 MG: 20 TABLET ORAL at 18:15

## 2022-01-15 RX ADMIN — AMLODIPINE BESYLATE 5 MG: 5 TABLET ORAL at 09:33

## 2022-01-15 RX ADMIN — CEFTRIAXONE 1000 MG: 1 INJECTION, SOLUTION INTRAVENOUS at 16:28

## 2022-01-15 RX ADMIN — BISOPROLOL FUMARATE 5 MG: 5 TABLET ORAL at 09:33

## 2022-01-15 RX ADMIN — CHLORTHALIDONE 25 MG: 25 TABLET ORAL at 09:33

## 2022-01-15 RX ADMIN — CEFTRIAXONE 1000 MG: 1 INJECTION, SOLUTION INTRAVENOUS at 05:33

## 2022-01-15 RX ADMIN — BENZONATATE 100 MG: 100 CAPSULE ORAL at 14:43

## 2022-01-15 RX ADMIN — LORAZEPAM 0.5 MG: 0.5 TABLET ORAL at 18:15

## 2022-01-15 RX ADMIN — BENZONATATE 100 MG: 100 CAPSULE ORAL at 18:14

## 2022-01-15 RX ADMIN — CLOPIDOGREL BISULFATE 75 MG: 75 TABLET ORAL at 09:33

## 2022-01-15 RX ADMIN — ACETAMINOPHEN 650 MG: 325 TABLET, FILM COATED ORAL at 18:15

## 2022-01-15 NOTE — PLAN OF CARE
Problem: Potential for Falls  Goal: Patient will remain free of falls  Description: INTERVENTIONS:  - Educate patient/family on patient safety including physical limitations  - Instruct patient to call for assistance with activity   - Consult OT/PT to assist with strengthening/mobility   - Keep Call bell within reach  - Keep bed low and locked with side rails adjusted as appropriate  - Keep care items and personal belongings within reach  - Initiate and maintain comfort rounds  - Make Fall Risk Sign visible to staff  - Offer Toileting every 1 Hours, in advance of need  - Initiate/Maintain N/A alarm  - Obtain necessary fall risk management equipment: cane  - Apply yellow socks and bracelet for high fall risk patients  - Consider moving patient to room near nurses station  Outcome: Progressing

## 2022-01-15 NOTE — PLAN OF CARE
Problem: Potential for Falls  Goal: Patient will remain free of falls  Description: INTERVENTIONS:  - Educate patient/family on patient safety including physical limitations  - Instruct patient to call for assistance with activity   - Consult OT/PT to assist with strengthening/mobility   - Keep Call bell within reach  - Keep bed low and locked with side rails adjusted as appropriate  - Keep care items and personal belongings within reach  - Initiate and maintain comfort rounds  - Make Fall Risk Sign visible to staff  - Offer Toileting every 2 Hours, in advance of need  - Initiate/Maintain bed alarm  - Obtain necessary fall risk management equipment:   - Apply yellow socks and bracelet for high fall risk patients  - Consider moving patient to room near nurses station  Outcome: Progressing     Problem: SAFETY ADULT  Goal: Patient will remain free of falls  Description: INTERVENTIONS:  - Educate patient/family on patient safety including physical limitations  - Instruct patient to call for assistance with activity   - Consult OT/PT to assist with strengthening/mobility   - Keep Call bell within reach  - Keep bed low and locked with side rails adjusted as appropriate  - Keep care items and personal belongings within reach  - Initiate and maintain comfort rounds  - Make Fall Risk Sign visible to staff  - Offer Toileting every 2 Hours, in advance of need  - Initiate/Maintain bed alarm  - Obtain necessary fall risk management equipment:   - Apply yellow socks and bracelet for high fall risk patients  - Consider moving patient to room near nurses station  Outcome: Progressing

## 2022-01-15 NOTE — PROGRESS NOTES
[place]  Progress Note - [place] 1933, 80 y o  female MRN: [id number]  Unit/Bed#: -01 Encounter: [id number]  Primary Care Provider: Saundra Montgomery MD   Date and time admitted to hospital: 1/14/2022 12:02 AM    Acute respiratory insufficiency  Assessment & Plan  · SpO2 on RA 88-90%   · SpO2 improve to 93-97% on 2L NC-> wean down to room air  · COVID 19 PCR negative  · Likely secondary to acute bronchitis   · Resp protocol  · Incentive spirometry  · Acapella    Atrial fibrillation (HCC)  Assessment & Plan  · Home regimen: bisoprolol 5 mg daily and anticoagulation with coumadin 5mg daily except Monday 2 5mg   · INR 3 84 this AM - hold coumadin today     Cardiac pacemaker in situ  Assessment & Plan  · Placed in September 2021 at [address]   · Worsening cough over the last 2 weeks   · Treated with azithromycin with last dose on Monday with mild improvement in cough   · CXR no acute cardiopulmonary disease  · Continue tessalon perles TID     Hypertension  Assessment & Plan  · Home regimen: amlodipine 5mg daily, chlorthalidone 25mg daily, and bisoprolol 10mg daily   · Continue     * Acute cystitis without hematuria  Assessment & Plan  · Reports burning after urination   · UA: 30-50 WBCs, moderate bacteria, and positive nitrite   · Urine culture <100,00  · Febrile to 100 8F in ED with leukocytosis at 13  23K  · ceftriaxone day 3      VTE Pharmacologic Prophylaxis: VTE Score: 4 Moderate Risk (Score 3-4) - Pharmacological DVT Prophylaxis Contraindicated  Sequential Compression Devices Ordered  Patient Centered Rounds: I performed bedside rounds with nursing staff today  Discussions with Specialists or Other Care Team Provider: none    Education and Discussions with Family / Patient: Attempted to update  (daughter) via phone  Left voicemail  Time Spent for Care: 20 minutes   More than 50% of total time spent on counseling and coordination of care as described above  Current Length of Stay: 1 day(s)  Current Patient Status: Inpatient   Certification Statement: The patient will continue to require additional inpatient hospital stay due to additional day for bld cx results  Discharge Plan: Anticipate discharge tomorrow to home  Code Status: Level 1 - Full Code    Subjective:   [place] the seen and examined at bedside  No acute events overnight  States that she feels well  Continues to have a productive cough of clear sputum  Has no other complaints  All symptoms on review of systems negative  All questions and concerns were answered and addressed  Objective:     Vitals:   Temp (24hrs), Av 6 °F (37 °C), Min:98 4 °F (36 9 °C), Max:98 9 °F (37 2 °C)    Temp:  [98 4 °F (36 9 °C)-98 9 °F (37 2 °C)] 98 4 °F (36 9 °C)  HR:  [60-67] 67  Resp:  [20-27] 20  BP: (129-142)/(56-93) 142/64  SpO2:  [93 %-96 %] 96 %  Body mass index is 24 86 kg/m²  Input and Output Summary (last 24 hours): Intake/Output Summary (Last 24 hours) at 1/15/2022 1531  Last data filed at 1/15/2022 1000  Gross per 24 hour   Intake 350 ml   Output 1050 ml   Net -700 ml       Physical Exam:   Physical Exam  Vitals and nursing note reviewed  Constitutional:       Appearance: Normal appearance  HENT:      Head: Normocephalic and atraumatic  Cardiovascular:      Rate and Rhythm: Normal rate and regular rhythm  Pulses: Normal pulses  Heart sounds: Normal heart sounds  Pulmonary:      Effort: Pulmonary effort is normal       Breath sounds: Normal breath sounds  Abdominal:      General: Abdomen is flat  Bowel sounds are normal       Palpations: Abdomen is soft  Tenderness: There is no right CVA tenderness or left CVA tenderness  Musculoskeletal:      Right lower leg: No edema  Left lower leg: No edema  Skin:     General: Skin is warm  Neurological:      General: No focal deficit present        Mental Status: She is alert and oriented to person, place, and time  Additional Data:     Labs:  Results from last 7 days   Lab Units 01/15/22  1432   WBC Thousand/uL 8 82   HEMOGLOBIN g/dL 13 1   HEMATOCRIT % 42 3   PLATELETS Thousands/uL 248   NEUTROS PCT % 78*   LYMPHS PCT % 13*   MONOS PCT % 7   EOS PCT % 1     Results from last 7 days   Lab Units 01/15/22  1113 01/14/22  0530 01/14/22  0044   SODIUM mmol/L 140   < > 137   POTASSIUM mmol/L 4 1   < > 3 1*   CHLORIDE mmol/L 105   < > 100   CO2 mmol/L 28   < > 27   BUN mg/dL 16   < > 20   CREATININE mg/dL 1 08   < > 1 13   ANION GAP mmol/L 7   < > 10   CALCIUM mg/dL 9 1   < > 8 9   ALBUMIN g/dL  --   --  3 3*   TOTAL BILIRUBIN mg/dL  --   --  0 60   ALK PHOS U/L  --   --  71   ALT U/L  --   --  32   AST U/L  --   --  23   GLUCOSE RANDOM mg/dL 83   < > 133    < > = values in this interval not displayed  Results from last 7 days   Lab Units 01/15/22  0539   INR  3 84*             Results from last 7 days   Lab Units 01/15/22  0539 01/14/22  0231 01/14/22  0044   LACTIC ACID mmol/L  --  0 8  --    PROCALCITONIN ng/ml 0 44*  --  0 21       Lines/Drains:  Invasive Devices  Report    Peripheral Intravenous Line            Peripheral IV 01/14/22 Right Antecubital 1 day          Drain            External Urinary Catheter <1 day                      Imaging:  No new imaging needing review    Recent Cultures (last 7 days):   Results from last 7 days   Lab Units 01/14/22  0436 01/14/22  0231   BLOOD CULTURE   --  No Growth at 24 hrs  No Growth at 24 hrs     URINE CULTURE  <10,000 cfu/ml   --        Last 24 Hours Medication List:   Current Facility-Administered Medications   Medication Dose Route Frequency Provider Last Rate    acetaminophen  650 mg Oral Q6H PRN Ambreen Tony PA-C      amLODIPine  5 mg Oral Daily Ambreen Tony PA-C      benzonatate  100 mg Oral TID PRN Ambreen Tony PA-C      bisoprolol  5 mg Oral Daily Ambreen Tony PA-C      calcium carbonate  1,000 mg Oral Daily PRN Lonie Beagle, PA-C      cefTRIAXone  1,000 mg Intravenous Q24H Lonie Beagle, PA-C 1,000 mg (01/15/22 0533)    chlorthalidone  25 mg Oral Daily Lonhay Beagle, PA-C      clopidogrel  75 mg Oral Daily Lonie Beagle, PA-C      LORazepam  0 5 mg Oral Daily PRN Narendra Beagle, PA-C      ondansetron  4 mg Intravenous Q6H PRN Narendra Beagle, PA-C      pravastatin  20 mg Oral After [employer], PA-C      senna  1 tablet Oral HS PRN Narendra Beagle, PA-C          Today, Patient Was Seen By: Josey Chand MD    **Please Note: This note may have been constructed using a voice recognition system  **

## 2022-01-15 NOTE — UTILIZATION REVIEW
Initial Clinical Review    Admission: Date/Time/Statement:   Admission Orders (From admission, onward)     Ordered        01/14/22 0214  INPATIENT ADMISSION  Once                      Orders Placed This Encounter   Procedures    INPATIENT ADMISSION     Standing Status:   Standing     Number of Occurrences:   1     Order Specific Question:   Level of Care     Answer:   Med Surg [16]     Order Specific Question:   Estimated length of stay     Answer:   More than 2 Midnights     Order Specific Question:   Certification     Answer:   I certify that inpatient services are medically necessary for this patient for a duration of greater than two midnights  See H&P and MD Progress Notes for additional information about the patient's course of treatment  ED Arrival Information     Expected Arrival Acuity    - 1/14/2022 00:02 Urgent         Means of arrival Escorted by Service Admission type    Ambulance [facility]) Hospitalist Urgent         Arrival complaint    Cough        Chief Complaint   Patient presents with    Cough     Pt arrived to ED via EMS, has cough since November  Initial Presentation: 80year old female, presented to the ED @ [facility] from home via EMS  Admitted as Inpatient due to Cystitis  PMH of atrial fibrillation on Coumadin, pacemaker placement, HTN, and recent pneumonia in November 2021  Date: 01/14/2022  Worsening cough over the past 2 weeks  Reports treatment with azithromycin for pneumonia with improvement in symptoms  Two weeks ago she developed a productive cough of yellow phlegm  She was again treated with azithromycin with last dose 4 days ago with mild improvement in cough  No intake of cough suppressants  Reports some chills at home, but no fever  Denies dyspnea, abdominal pain, nausea, vomiting, or change in bowel habits  She does endorse burning after urination for a couple of days this last week   Daughter reports change in mental status yesterday, described as using a tissue as a tv remote  IV Ceftriaxone  Acute respiratory insufficiency  RA sat 88 to 90%  O2 on 2 2L via NC   COVID negative  IS  Acapella  VTE Pharmacologic Prophylaxis: VTE Score: 4 Moderate Risk (Score 3-4) - Pharmacological DVT Prophylaxis Ordered: heparin  Day 2:01/15/2022  Continue Cefriaxone  Monitor respiratory status  O2 @ 2L via NC,  jackie as able        ED Triage Vitals   Temperature Pulse Respirations Blood Pressure SpO2   01/14/22 0005 01/14/22 0005 01/14/22 0005 01/14/22 0005 01/14/22 0005   (!) 100 8 °F (38 2 °C) 72 20 148/60 97 %      Temp Source Heart Rate Source Patient Position - Orthostatic VS BP Location FiO2 (%)   01/14/22 0005 01/14/22 0005 01/14/22 0130 01/14/22 0130 --   Oral Monitor Sitting Right arm       Pain Score       01/14/22 0100       No Pain          Wt Readings from Last 1 Encounters:   01/14/22 69 9 kg (154 lb)     Additional Vital Signs:   Date/Time Temp Pulse Resp BP MAP (mmHg) SpO2 Calculated FIO2 (%) - Nasal Cannula Nasal Cannula O2 Flow Rate (L/min) O2 Device Patient Position - Orthostatic VS   01/15/22 1500 98 9 °F (37 2 °C) 63 15 159/71 -- 95 % -- -- None (Room air) Sitting   01/15/22 0735 98 4 °F (36 9 °C) 67 20 142/64 92 -- -- -- -- Lying   01/14/22 2249 98 4 °F (36 9 °C) 63 27 Abnormal  135/93 111 96 % -- -- -- Lying   01/14/22 2000 -- -- -- -- -- -- -- -- None (Room air) --   01/14/22 1747 -- -- -- -- -- 93 % -- -- None (Room air) --   01/14/22 1716 98 9 °F (37 2 °C) 60 22 129/56 -- 93 % -- -- None (Room air) Lying   01/14/22 0930 -- -- -- 148/67 -- -- -- -- -- --   01/14/22 0500 -- 60 20 110/52 75 92 % 24 1 L/min Nasal cannula Sitting   01/14/22 0432 98 3 °F (36 8 °C) -- -- -- -- -- -- -- -- --   01/14/22 0430 -- 60 22 105/51 74 94 % 24 1 L/min Nasal cannula --   01/14/22 0130 -- 62 20 115/60 82 93 % 24 1 L/min Nasal cannula Sitting   01/14/22 0100 -- 65 20 123/78 94 96 % 24 1 L/min Nasal cannula --     01/14/2022 @ 0840  Chest X:  No acute cardiopulmonary disease       2022 @ 0049  EC, NSR    Pertinent Labs/Diagnostic Test Results:   Results from last 7 days   Lab Units 22  0044   SARS-COV-2  Negative     Results from last 7 days   Lab Units 01/15/22  1432 22  0530 22  0044   WBC Thousand/uL 8 82 12 41* 13 23*   HEMOGLOBIN g/dL 13 1 11 5 12 2   HEMATOCRIT % 42 3 37 0 38 3   PLATELETS Thousands/uL 248 238 242   NEUTROS ABS Thousands/µL 6 89  --  11 30*     Results from last 7 days   Lab Units 01/15/22  1113 22  0530 22  0044   SODIUM mmol/L 140 137 137   POTASSIUM mmol/L 4 1 3 1* 3 1*   CHLORIDE mmol/L 105 101 100   CO2 mmol/L 28 26 27   ANION GAP mmol/L 7 10 10   BUN mg/dL 16 21 20   CREATININE mg/dL 1 08 1 22 1 13   EGFR ml/min/1 73sq m 45 39 43   CALCIUM mg/dL 9 1 9 0 8 9   MAGNESIUM mg/dL 1 8  --   --      Results from last 7 days   Lab Units 22  0044   AST U/L 23   ALT U/L 32   ALK PHOS U/L 71   TOTAL PROTEIN g/dL 7 1   ALBUMIN g/dL 3 3*   TOTAL BILIRUBIN mg/dL 0 60     Results from last 7 days   Lab Units 01/15/22  1113 22  0530 22  0044   GLUCOSE RANDOM mg/dL 83 114 133     Results from last 7 days   Lab Units 22  0044   HS TNI 0HR ng/L 17     Results from last 7 days   Lab Units 01/15/22  0539 22  0530 22  0044   PROTIME seconds 36 6* 36 1* 31 5*   INR  3 84* 3 77* 3 16*   PTT seconds  --   --  51*     Results from last 7 days   Lab Units 01/15/22  0539 22  0044   PROCALCITONIN ng/ml 0 44* 0 21     Results from last 7 days   Lab Units 22  0231   LACTIC ACID mmol/L 0 8     Results from last 7 days   Lab Units 22  0044   NT-PRO BNP pg/mL 906*     Results from last 7 days   Lab Units 22  0044   LIPASE u/L 124     Results from last 7 days   Lab Units 22  0436   CLARITY UA  Slightly Cloudy   COLOR UA  Yellow   SPEC GRAV UA  1 020   PH UA  6 0   GLUCOSE UA mg/dl Negative   KETONES UA mg/dl Negative   BLOOD UA  Large*   PROTEIN UA mg/dl Negative NITRITE UA  Positive*   BILIRUBIN UA  Negative   UROBILINOGEN UA E U /dl 0 2   LEUKOCYTES UA  Moderate*   WBC UA /hpf 30-50*   RBC UA /hpf 2-4   BACTERIA UA /hpf Moderate*   EPITHELIAL CELLS WET PREP /hpf Occasional     Results from last 7 days   Lab Units 01/14/22  0044   INFLUENZA A PCR  Negative   INFLUENZA B PCR  Negative   RSV PCR  Negative     Results from last 7 days   Lab Units 01/14/22  0436 01/14/22  0231   BLOOD CULTURE   --  No Growth at 24 hrs  No Growth at 24 hrs     URINE CULTURE  <10,000 cfu/ml   --      ED Treatment:   Medication Administration from 01/14/2022 0002 to 01/14/2022 1716       Date/Time Order Dose Route Action     01/14/2022 0101 acetaminophen (TYLENOL) tablet 975 mg 975 mg Oral Given     01/14/2022 0101 benzonatate (TESSALON PERLES) capsule 100 mg 100 mg Oral Given     01/14/2022 0128 potassium chloride (K-DUR,KLOR-CON) CR tablet 40 mEq 40 mEq Oral Given     01/14/2022 0237 cefTRIAXone (ROCEPHIN) IVPB (premix in dextrose) 1,000 mg 50 mL 1,000 mg Intravenous New Bag     01/14/2022 0930 amLODIPine (NORVASC) tablet 5 mg 5 mg Oral Given     01/14/2022 0930 bisoprolol (ZEBETA) tablet 5 mg 5 mg Oral Given     01/14/2022 0930 chlorthalidone tablet 25 mg 25 mg Oral Given     01/14/2022 0928 clopidogrel (PLAVIX) tablet 75 mg 75 mg Oral Given     01/14/2022 0930 LORazepam (ATIVAN) tablet 0 5 mg 0 5 mg Oral Given     01/14/2022 0928 calcium carbonate (TUMS) chewable tablet 1,000 mg 1,000 mg Oral Given     01/14/2022 1652 potassium chloride (K-DUR,KLOR-CON) CR tablet 40 mEq 40 mEq Oral Given     01/14/2022 0929 potassium chloride (K-DUR,KLOR-CON) CR tablet 40 mEq 40 mEq Oral Given        Past Medical History:   Diagnosis Date    Ambulatory dysfunction 1/25/2016    Anxiety     Arthritis     Basal cell carcinoma     BBB (bundle branch block)     Hyperlipidemia     Hypertension     Hypoglycemia     Hypoglycemia     Right knee pain      Present on Admission:   Hypertension   Cardiac pacemaker in situ   Atrial fibrillation (HCC)      Admitting Diagnosis: Cough [R05 9]  Hypokalemia [E87 6]  UTI (urinary tract infection) [N39 0]  Bronchitis [J40]  Hypoxia [R09 02]  Age/Sex: 80 y o  female  Admission Orders:  Contact and Airborne Isolation  Marcos SCDs    Scheduled Medications:  amLODIPine, 5 mg, Oral, Daily  bisoprolol, 5 mg, Oral, Daily  cefTRIAXone, 1,000 mg, Intravenous, Q24H  chlorthalidone, 25 mg, Oral, Daily  clopidogrel, 75 mg, Oral, Daily  pravastatin, 20 mg, Oral, After Dinner      Continuous IV Infusions:     PRN Meds:  acetaminophen, 650 mg, Oral, Q6H PRN  benzonatate, 100 mg, Oral, TID PRN  calcium carbonate, 1,000 mg, Oral, Daily PRN  LORazepam, 0 5 mg, Oral, Daily PRN  ondansetron, 4 mg, Intravenous, Q6H PRN  senna, 1 tablet, Oral, HS PRN            Network Utilization Review Department  ATTENTION: Please call with any questions or concerns to [phone] and carefully listen to the prompts so that you are directed to the right person  All voicemails are confidential   Candia [employer]ens all requests for admission clinical reviews, approved or denied determinations and any other requests to dedicated fax number below belonging to the campus where the patient is receiving treatment   List of dedicated fax numbers for the Facilities:  [address] DENIALS (Administrative/Medical Necessity) [phone]   [address] (Maternity/NICU/Pediatrics) [phone]   [address] 40 125 [facility]  [phone]   Allison Doe 50 [address] [facility] [place] Callum Anand Yordan [address] [id number] [address] [facility] [phone]   [place] [address] [phone]

## 2022-01-15 NOTE — CASE MANAGEMENT
Case Management Assessment    Patient name Jenna Worthy  Location [id number] MRN [id number]  : 1933 Date 1/15/2022       Current Admission Date: 2022  Current Admission Diagnosis:Acute cystitis without hematuria   Patient Active Problem List    Diagnosis Date Noted    Bronchitis 2022    Cardiac pacemaker in situ 2022    Atrial fibrillation ([address] ) 2022    Acute respiratory insufficiency 2022    Acute cystitis without hematuria 2022    Ambulatory dysfunction 2016    Hypertension 2016      LOS (days): 1  Geometric Mean LOS (GMLOS) (days): 2 30  Days to GMLOS:0 8     OBJECTIVE:    Risk of Unplanned Readmission Score: 12         Current admission status: Inpatient       Preferred Pharmacy:   [address] [place] [address] [address] [place] [id number]  Phone: [phone] Fax: [phone]    Primary Care Provider: Diego Rodríguez MD    Primary Insurance: [facility]  Secondary Insurance:     ASSESSMENT:  [employer]    There are no active Health Care Agents on file  Advance Directives  Does patient have a Health Care POA?: Yes  Does patient have Advance Directives?: Yes  Advance Directives: Living will,Power of  for health care (Asked her to have family bring copy in)  Primary Contact: Maryjane Johnson              Patient Information  Admitted from[de-identified] Home  Mental Status: Alert  During Assessment patient was accompanied by: Not accompanied during assessment  Assessment information provided by[de-identified] Patient  Primary Caregiver: Family  Caregiver's Name[de-identified] [facility] Relationship to Patient[de-identified] Family Member  Caregiver's Telephone Number[de-identified] [phone]  Support Systems: Daughter  [place] of Residence:  [facility] do you live in?: [address] access options   Select all that apply : Stairs  Number of steps to enter home : 3  Type of Current Zee Perera  MRN [id number]   1957    Chief Complaint   Patient presents with   • Ear Problem     LT ear severe pain (PT REFUSED TO BE WEIGHED)      HPI    URI sx's started last week- cough, ST, nasal congestion. These sx's have improved. No SOB. No diarrhea. No     L ear started hurting about 2 days ago. Has felt dizzy a few times since then. Hurts to chew on L at jaw. May have had a little d'c from L ear. Pain has been severe at times. Took advil, modest relief. [Pt requests pain medication.]   Patient has not been swimming. [place] did get her head wet, but not underwater. This was 4 days ago.     Pt states she has been feeling achey for the last 2 days, and felt feverish last night though she did not check her temperature. No chills.    NOTE:  This patient screened negative for COVID at the entrance to our facility, so full PPE was not worn by MA or myself, until I elicited the history as above. At that time, I left the room and donned full PPE and obtained the COVID antigen nasal swab.     Review of Systems   Constitutional: Positive for fatigue. Negative for activity change, appetite change, chills, diaphoresis and fever.   HENT: Positive for congestion, ear discharge, ear pain, hearing loss, postnasal drip and rhinorrhea. Negative for sinus pressure, sore throat and trouble swallowing.    Eyes: Negative for pain, discharge, redness and itching.   Respiratory: Positive for cough. Negative for chest tightness, shortness of breath and wheezing.    Cardiovascular: Negative for chest pain.   Gastrointestinal: Negative.    Skin: Negative for rash.       ALLERGIES:  No Known Allergies    Outpatient Medications Marked as Taking for the 8/3/21 encounter (Office Visit) with Sakina Rayo PA-C   Medication Sig Dispense Refill   • trazodone (DESYREL) 150 MG tablet TAKE 1 TABLET BY MOUTH EVERY NIGHT 90 tablet 0   • [DISCONTINUED] ALPRAZolam (XANAX) 0.5 MG tablet Take 1 tablet by mouth  Residence: [facility]  In the last 12 months, was there a time when you were not able to pay the mortgage or rent on time?: No  In the last 12 months, how many places have you lived?: 1  In the last 12 months, was there a time when you did not have a steady place to sleep or slept in a shelter (including now)?: No  Homeless/housing insecurity resource given?: N/A  Living Arrangements: Lives w/ Daughter  Is patient a ?: No    Activities of Daily Living Prior to Admission  Functional Status: Independent  Completes ADLs independently?: Yes  Ambulates independently?: Yes  Does patient use assisted devices?: No  Does patient currently own DME?: Yes  What DME does the patient currently own?: Walker,Bedside Commode,Shower Chair  Does patient have a history of Outpatient Therapy (PT/OT)?: No  Does the patient have a history of Short-Term Rehab?: No  Does patient have a history of HHC?: Yes ([facility])  Does patient currently have [employer]?: No         Patient Information Continued  Income Source: Pension/nursing home  Does patient have prescription coverage?: Yes  Within the past 12 months, the food you bought just didnt last and you didnt have money to get more : Never true  Food insecurity resource given?: N/A  Does patient receive dialysis treatments?: No  Does patient have a history of substance abuse?: No  Does patient have a history of Mental Health Diagnosis?: No         Means of Transportation  Means of Transport to Appts[de-identified] Family transport  In the past 12 months, has lack of transportation kept you from medical appointments or from getting medications?: No  In the past 12 months, has lack of transportation kept you from meetings, work, or from getting things needed for daily living?: No      Met with patient she resides with her daughter who is retired in a mobile home with 3 POLLO  Patient is independent of ADL's, has a RW, shower chair and Rw at home    She plans to return home and anticipates no daily as needed for Anxiety. 30 tablet 0   • DULoxetine (CYMBALTA) 60 MG capsule TAKE 1 CAPSULE BY MOUTH DAILY 90 capsule 0   • levothyroxine 125 MCG tablet Take 1 tablet by mouth daily. 90 tablet 1   • hydroxychloroquine (PLAQUENIL) 200 MG tablet Take 200 mg by mouth daily.          Patient Active Problem List   Diagnosis   • ASHLEY positive   • Cystic acne   • Depression with anxiety   • Hypothyroidism   • Vitamin D deficiency   • Fatty liver   • Liver cyst   • Pancreas cyst   • Sleep disturbance   • Acute deep vein thrombosis (DVT) of right peroneal vein (CMS/HCC)   • Lupus (CMS/HCC)   • Benzodiazepine dependence (CMS/HCC)   • Trochanteric bursitis of left hip   • Mitral valve prolapse   • Acute mucoid otitis media of left ear   • Acute otitis externa of left ear       Physical Exam  Constitutional:       General: She is not in acute distress.     Appearance: She is well-developed. She is not diaphoretic.   HENT:      Head: Normocephalic.      Right Ear: Tympanic membrane, ear canal and external ear normal. No drainage, swelling or tenderness. No middle ear effusion.      Left Ear: External ear normal. Drainage (small amt), swelling (mild to mod of EAC, no erythema) and tenderness (mild, w pressure on tragus and traction on pinna) present. A middle ear effusion is present. No mastoid tenderness. Tympanic membrane is not scarred, perforated or erythematous.      Nose: Congestion and rhinorrhea present.      Right Sinus: No maxillary sinus tenderness or frontal sinus tenderness.      Left Sinus: No maxillary sinus tenderness or frontal sinus tenderness.      Mouth/Throat:      Mouth: Mucous membranes are moist.      Pharynx: Oropharynx is clear. Uvula midline. Posterior oropharyngeal erythema present. No oropharyngeal exudate.   Eyes:      General: Lids are normal.         Right eye: No discharge.         Left eye: No discharge.      Conjunctiva/sclera: Conjunctivae normal.      Pupils: Pupils are equal, round, and  discharge needs  her daughter will transport her home  reactive to light.   Neck:      Thyroid: No thyromegaly.   Cardiovascular:      Rate and Rhythm: Normal rate and regular rhythm.      Heart sounds: Normal heart sounds. No murmur heard.   No gallop.    Pulmonary:      Effort: Pulmonary effort is normal.      Breath sounds: Normal breath sounds. No decreased breath sounds, wheezing, rhonchi or rales.   Musculoskeletal:      Cervical back: Normal range of motion and neck supple.   Lymphadenopathy:      Cervical: No cervical adenopathy.   Skin:     General: Skin is warm and dry.   Neurological:      Mental Status: She is alert and oriented to person, place, and time.      Cranial Nerves: Cranial nerves are intact.      Coordination: Coordination is intact.      Gait: Gait is intact.      Deep Tendon Reflexes: Reflexes are normal and symmetric.         Zee was seen today for ear problem.    Diagnoses and all orders for this visit:    Acute suppurative otitis media of left ear without spontaneous rupture of tympanic membrane, recurrence not specified  -     Discontinue: cefUROXime (CEFTIN) 250 MG tablet; Take 1 tablet by mouth 2 times daily for 7 days.  -     cefUROXime (CEFTIN) 250 MG tablet; Take 1 tablet by mouth 2 times daily for 7 days.    Other infective acute otitis externa of left ear  -     Discontinue: cefUROXime (CEFTIN) 250 MG tablet; Take 1 tablet by mouth 2 times daily for 7 days.  -     cefUROXime (CEFTIN) 250 MG tablet; Take 1 tablet by mouth 2 times daily for 7 days.    Viral upper respiratory tract infection  -     COVID DIAGNOSTIC TEST    Acute mucoid otitis media of left ear    Other orders  -     Discontinue: ofloxacin (FLOXIN) 0.3 % otic solution; Place 5 drops into left ear 2 times daily. For 7 days  -     ofloxacin (FLOXIN) 0.3 % otic solution; Place 5 drops into left ear 2 times daily. For 7 days  -     HYDROcodone-acetaminophen (NORCO) 5-325 MG per tablet; Take 1 tablet by mouth 2 times daily as needed for Pain.        Acute mucoid otitis  media of left ear  Drink plenty of fluids to keep mucous membranes moist.  Use humidity and steam frequently, eg vaporizer at bedside during the night, extra showers or baths, steaming with pan of hot water and towel over head, etc.  Can take Mucinex (guaifenesin) to loosen secretions.  Try saline nasal spray or drops or neti-pot.  Consider otc Fluticasone or Nasacort nasal spray 2 sprays in each nostril daily to decrease congestion.  Followup if symptoms do not improve or if worsening or new symptoms develop.   Some symptoms may take a couple weeks to resolve.    Risks, benefits, side effects and usage of medications discussed with patient who expresses understanding and agreement.      Acute otitis externa of left ear  Use drops and take oral antibx as directed.   Use tylenol or advil first; use hydrocodone only if pain is severe and generally at hs/noc.  Risks, benefits, side effects and usage of medications discussed with patient who expresses understanding and agreement.          Electronically signed by: Sakina Rayo PA-C

## 2022-01-16 VITALS
HEIGHT: 66 IN | OXYGEN SATURATION: 95 % | DIASTOLIC BLOOD PRESSURE: 70 MMHG | BODY MASS INDEX: 24.75 KG/M2 | SYSTOLIC BLOOD PRESSURE: 119 MMHG | HEART RATE: 64 BPM | WEIGHT: 154 LBS | TEMPERATURE: 98.8 F | RESPIRATION RATE: 18 BRPM

## 2022-01-16 LAB
ANION GAP SERPL CALCULATED.3IONS-SCNC: 10 MMOL/L (ref 4–13)
BUN SERPL-MCNC: 18 MG/DL (ref 5–25)
CALCIUM SERPL-MCNC: 9.1 MG/DL (ref 8.3–10.1)
CHLORIDE SERPL-SCNC: 101 MMOL/L (ref 100–108)
CO2 SERPL-SCNC: 26 MMOL/L (ref 21–32)
CREAT SERPL-MCNC: 1.09 MG/DL (ref 0.6–1.3)
GFR SERPL CREATININE-BSD FRML MDRD: 45 ML/MIN/1.73SQ M
GLUCOSE SERPL-MCNC: 110 MG/DL (ref 65–140)
INR PPP: 2.66 (ref 0.84–1.19)
MAGNESIUM SERPL-MCNC: 1.8 MG/DL (ref 1.6–2.6)
POTASSIUM SERPL-SCNC: 3.6 MMOL/L (ref 3.5–5.3)
PROTHROMBIN TIME: 27.7 SECONDS (ref 11.6–14.5)
SODIUM SERPL-SCNC: 137 MMOL/L (ref 136–145)

## 2022-01-16 PROCEDURE — 85610 PROTHROMBIN TIME: CPT | Performed by: PHYSICIAN ASSISTANT

## 2022-01-16 PROCEDURE — 80048 BASIC METABOLIC PNL TOTAL CA: CPT | Performed by: STUDENT IN AN ORGANIZED HEALTH CARE EDUCATION/TRAINING PROGRAM

## 2022-01-16 PROCEDURE — 99238 HOSP IP/OBS DSCHRG MGMT 30/<: CPT | Performed by: STUDENT IN AN ORGANIZED HEALTH CARE EDUCATION/TRAINING PROGRAM

## 2022-01-16 PROCEDURE — 83735 ASSAY OF MAGNESIUM: CPT | Performed by: STUDENT IN AN ORGANIZED HEALTH CARE EDUCATION/TRAINING PROGRAM

## 2022-01-16 RX ORDER — BENZONATATE 100 MG/1
100 CAPSULE ORAL 3 TIMES DAILY PRN
Qty: 20 CAPSULE | Refills: 0 | Status: SHIPPED | OUTPATIENT
Start: 2022-01-16

## 2022-01-16 RX ORDER — CEFDINIR 300 MG/1
300 CAPSULE ORAL EVERY 12 HOURS SCHEDULED
Qty: 4 CAPSULE | Refills: 0 | Status: SHIPPED | OUTPATIENT
Start: 2022-01-16 | End: 2022-01-18

## 2022-01-16 RX ORDER — CEFDINIR 300 MG/1
300 CAPSULE ORAL EVERY 12 HOURS SCHEDULED
Qty: 4 CAPSULE | Refills: 0 | Status: SHIPPED | OUTPATIENT
Start: 2022-01-16 | End: 2022-01-16 | Stop reason: SDUPTHER

## 2022-01-16 RX ADMIN — BISOPROLOL FUMARATE 5 MG: 5 TABLET ORAL at 09:10

## 2022-01-16 RX ADMIN — CLOPIDOGREL BISULFATE 75 MG: 75 TABLET ORAL at 09:10

## 2022-01-16 RX ADMIN — CHLORTHALIDONE 25 MG: 25 TABLET ORAL at 09:10

## 2022-01-16 RX ADMIN — BENZONATATE 100 MG: 100 CAPSULE ORAL at 01:46

## 2022-01-16 RX ADMIN — AMLODIPINE BESYLATE 5 MG: 5 TABLET ORAL at 09:10

## 2022-01-16 NOTE — PLAN OF CARE
Problem: Potential for Falls  Goal: Patient will remain free of falls  Description: INTERVENTIONS:  - Educate patient/family on patient safety including physical limitations  - Instruct patient to call for assistance with activity   - Consult OT/PT to assist with strengthening/mobility   - Keep Call bell within reach  - Keep bed low and locked with side rails adjusted as appropriate  - Keep care items and personal belongings within reach  - Initiate and maintain comfort rounds  - Make Fall Risk Sign visible to staff  - Offer Toileting every 2 Hours, in advance of need  - Initiate/Maintain bed and chair alarm  - Obtain necessary fall risk management equipment: walker   - Apply yellow socks and bracelet for high fall risk patients  - Consider moving patient to room near nurses station  Outcome: Progressing     Problem: MOBILITY - ADULT  Goal: Maintain or return to baseline ADL function  Description: INTERVENTIONS:  - Educate patient/family on patient safety including physical limitations  - Instruct patient to call for assistance with activity   - Consult OT/PT to assist with strengthening/mobility   - Keep Call bell within reach  - Keep bed low and locked with side rails adjusted as appropriate  - Keep care items and personal belongings within reach  - Initiate and maintain comfort rounds  - Make Fall Risk Sign visible to staff  - Offer Toileting every 2 Hours, in advance of need  - Initiate/Maintain bed and chair alarm  - Obtain necessary fall risk management equipment: walker   - Apply yellow socks and bracelet for high fall risk patients  - Consider moving patient to room near nurses station  Outcome: Progressing  Goal: Maintains/Returns to pre admission functional level  Description: INTERVENTIONS:  - Perform BMAT or MOVE assessment daily    - Set and communicate daily mobility goal to care team and patient/family/caregiver     - Collaborate with rehabilitation services on mobility goals if consulted  - Perform Range of Motion 3 times a day  - Reposition patient every 2 hours    - Dangle patient 3 times a day  - Stand patient 3 times a day  - Ambulate patient 3 times a day  - Out of bed to chair 3 times a day   - Out of bed for meals 3 times a day  - Out of bed for toileting  - Record patient progress and toleration of activity level   Outcome: Progressing     Problem: PAIN - ADULT  Goal: Verbalizes/displays adequate comfort level or baseline comfort level  Description: Interventions:  - Encourage patient to monitor pain and request assistance  - Assess pain using appropriate pain scale  - Administer analgesics based on type and severity of pain and evaluate response  - Implement non-pharmacological measures as appropriate and evaluate response  - Consider cultural and social influences on pain and pain management  - Notify physician/advanced practitioner if interventions unsuccessful or patient reports new pain  Outcome: Progressing     Problem: INFECTION - ADULT  Goal: Absence or prevention of progression during hospitalization  Description: INTERVENTIONS:  - Assess and monitor for signs and symptoms of infection  - Monitor lab/diagnostic results  - Monitor all insertion sites, i e  indwelling lines, tubes, and drains  - Monitor endotracheal if appropriate and nasal secretions for changes in amount and color  - [place] appropriate cooling/warming therapies per order  - Administer medications as ordered  - Instruct and encourage patient and family to use good hand hygiene technique  - Identify and instruct in appropriate isolation precautions for identified infection/condition  Outcome: Progressing  Goal: Absence of fever/infection during neutropenic period  Description: INTERVENTIONS:  - Monitor WBC    Outcome: Progressing     Problem: SAFETY ADULT  Goal: Patient will remain free of falls  Description: INTERVENTIONS:  - Educate patient/family on patient safety including physical limitations  - Instruct patient to call for assistance with activity   - Consult OT/PT to assist with strengthening/mobility   - Keep Call bell within reach  - Keep bed low and locked with side rails adjusted as appropriate  - Keep care items and personal belongings within reach  - Initiate and maintain comfort rounds  - Make Fall Risk Sign visible to staff  - Offer Toileting every 2 Hours, in advance of need  - Initiate/Maintain bed and chair alarm  - Obtain necessary fall risk management equipment: walker   - Apply yellow socks and bracelet for high fall risk patients  - Consider moving patient to room near nurses station  Outcome: Progressing  Goal: Maintain or return to baseline ADL function  Description: INTERVENTIONS:  - Educate patient/family on patient safety including physical limitations  - Instruct patient to call for assistance with activity   - Consult OT/PT to assist with strengthening/mobility   - Keep Call bell within reach  - Keep bed low and locked with side rails adjusted as appropriate  - Keep care items and personal belongings within reach  - Initiate and maintain comfort rounds  - Make Fall Risk Sign visible to staff  - Offer Toileting every 2 Hours, in advance of need  - Initiate/Maintain bed and chair alarm  - Obtain necessary fall risk management equipment: walker   - Apply yellow socks and bracelet for high fall risk patients  - Consider moving patient to room near nurses station  Outcome: Progressing  Goal: Maintains/Returns to pre admission functional level  Description: INTERVENTIONS:  - Perform BMAT or MOVE assessment daily    - Set and communicate daily mobility goal to care team and patient/family/caregiver  - Collaborate with rehabilitation services on mobility goals if consulted  - Perform Range of Motion 3 times a day  - Reposition patient every 2 hours    - Dangle patient 3 times a day  - Stand patient 3 times a day  - Ambulate patient 3 times a day  - Out of bed to chair 3 times a day   - Out of bed for meals 3 times a day  - Out of bed for toileting  - Record patient progress and toleration of activity level   Outcome: Progressing     Problem: DISCHARGE PLANNING  Goal: Discharge to home or other facility with appropriate resources  Description: INTERVENTIONS:  - Identify barriers to discharge w/patient and caregiver  - Arrange for needed discharge resources and transportation as appropriate  - Identify discharge learning needs (meds, wound care, etc )  - Arrange for interpretive services to assist at discharge as needed  - Refer to Case Management Department for coordinating discharge planning if the patient needs post-hospital services based on physician/advanced practitioner order or complex needs related to functional status, cognitive ability, or social support system  Outcome: Progressing     Problem: Knowledge Deficit  Goal: Patient/family/caregiver demonstrates understanding of disease process, treatment plan, medications, and discharge instructions  Description: Complete learning assessment and assess knowledge base    Interventions:  - Provide teaching at level of understanding  - Provide teaching via preferred learning methods  Outcome: Progressing     Problem: Prexisting or High Potential for Compromised Skin Integrity  Goal: Skin integrity is maintained or improved  Description: INTERVENTIONS:  - Identify patients at risk for skin breakdown  - Assess and monitor skin integrity  - Assess and monitor nutrition and hydration status  - Monitor labs   - Assess for incontinence   - Turn and reposition patient  - Assist with mobility/ambulation  - Relieve pressure over bony prominences  - Avoid friction and shearing  - Provide appropriate hygiene as needed including keeping skin clean and dry  - Evaluate need for skin moisturizer/barrier cream  - Collaborate with interdisciplinary team   - Patient/family teaching  - Consider wound care consult   Outcome: Progressing

## 2022-01-16 NOTE — PLAN OF CARE
Problem: Potential for Falls  Goal: Patient will remain free of falls  Description: INTERVENTIONS:  - Educate patient/family on patient safety including physical limitations  - Instruct patient to call for assistance with activity   - Consult OT/PT to assist with strengthening/mobility   - Keep Call bell within reach  - Keep bed low and locked with side rails adjusted as appropriate  - Keep care items and personal belongings within reach  - Initiate and maintain comfort rounds  - Make Fall Risk Sign visible to staff  - Offer Toileting every 2 Hours, in advance of need  - Initiate/Maintain bed and chair alarm  - Obtain necessary fall risk management equipment: walker   - Apply yellow socks and bracelet for high fall risk patients  - Consider moving patient to room near nurses station  1/16/2022 1003 by Ambreen Cadet RN  Outcome: Adequate for Discharge  1/16/2022 0926 by Ambreen Cadet RN  Outcome: Progressing     Problem: MOBILITY - ADULT  Goal: Maintain or return to baseline ADL function  Description: INTERVENTIONS:  - Educate patient/family on patient safety including physical limitations  - Instruct patient to call for assistance with activity   - Consult OT/PT to assist with strengthening/mobility   - Keep Call bell within reach  - Keep bed low and locked with side rails adjusted as appropriate  - Keep care items and personal belongings within reach  - Initiate and maintain comfort rounds  - Make Fall Risk Sign visible to staff  - Offer Toileting every 2 Hours, in advance of need  - Initiate/Maintain bed and chair alarm  - Obtain necessary fall risk management equipment: walker   - Apply yellow socks and bracelet for high fall risk patients  - Consider moving patient to room near nurses station  1/16/2022 1003 by Ambreen Cadet RN  Outcome: Adequate for Discharge  1/16/2022 0926 by Ambreen Cadet RN  Outcome: Progressing  Goal: Maintains/Returns to pre admission functional level  Description: INTERVENTIONS:  - Perform BMAT or MOVE assessment daily    - Set and communicate daily mobility goal to care team and patient/family/caregiver  - Collaborate with rehabilitation services on mobility goals if consulted  - Perform Range of Motion 3 times a day  - Reposition patient every 2 hours    - Dangle patient 3 times a day  - Stand patient 3 times a day  - Ambulate patient 3 times a day  - Out of bed to chair 3 times a day   - Out of bed for meals 3 times a day  - Out of bed for toileting  - Record patient progress and toleration of activity level   1/16/2022 1003 by Jose Clarke RN  Outcome: Adequate for Discharge  1/16/2022 0926 by Jose Clarke RN  Outcome: Progressing     Problem: PAIN - ADULT  Goal: Verbalizes/displays adequate comfort level or baseline comfort level  Description: Interventions:  - Encourage patient to monitor pain and request assistance  - Assess pain using appropriate pain scale  - Administer analgesics based on type and severity of pain and evaluate response  - Implement non-pharmacological measures as appropriate and evaluate response  - Consider cultural and social influences on pain and pain management  - Notify physician/advanced practitioner if interventions unsuccessful or patient reports new pain  1/16/2022 1003 by Jose Clarke RN  Outcome: Adequate for Discharge  1/16/2022 0926 by Jose Clarke RN  Outcome: Progressing     Problem: INFECTION - ADULT  Goal: Absence or prevention of progression during hospitalization  Description: INTERVENTIONS:  - Assess and monitor for signs and symptoms of infection  - Monitor lab/diagnostic results  - Monitor all insertion sites, i e  indwelling lines, tubes, and drains  - Monitor endotracheal if appropriate and nasal secretions for changes in amount and color  - [place] appropriate cooling/warming therapies per order  - Administer medications as ordered  - Instruct and encourage patient and family to use good hand hygiene technique  - Identify and instruct in appropriate isolation precautions for identified infection/condition  1/16/2022 1003 by Jb Fuller RN  Outcome: Adequate for Discharge  1/16/2022 0926 by Jb Fuller RN  Outcome: Progressing  Goal: Absence of fever/infection during neutropenic period  Description: INTERVENTIONS:  - Monitor WBC    1/16/2022 1003 by Jb Fuller RN  Outcome: Adequate for Discharge  1/16/2022 0926 by Jb Fuller RN  Outcome: Progressing     Problem: SAFETY ADULT  Goal: Patient will remain free of falls  Description: INTERVENTIONS:  - Educate patient/family on patient safety including physical limitations  - Instruct patient to call for assistance with activity   - Consult OT/PT to assist with strengthening/mobility   - Keep Call bell within reach  - Keep bed low and locked with side rails adjusted as appropriate  - Keep care items and personal belongings within reach  - Initiate and maintain comfort rounds  - Make Fall Risk Sign visible to staff  - Offer Toileting every 2 Hours, in advance of need  - Initiate/Maintain bed and chair alarm  - Obtain necessary fall risk management equipment: walker   - Apply yellow socks and bracelet for high fall risk patients  - Consider moving patient to room near nurses station  1/16/2022 1003 by Jb Fuller RN  Outcome: Adequate for Discharge  1/16/2022 0926 by Jb Fuller RN  Outcome: Progressing  Goal: Maintain or return to baseline ADL function  Description: INTERVENTIONS:  - Educate patient/family on patient safety including physical limitations  - Instruct patient to call for assistance with activity   - Consult OT/PT to assist with strengthening/mobility   - Keep Call bell within reach  - Keep bed low and locked with side rails adjusted as appropriate  - Keep care items and personal belongings within reach  - Initiate and maintain comfort rounds  - Make Fall Risk Sign visible to staff  - Offer Toileting every 2 Hours, in advance of need  - Initiate/Maintain bed and chair alarm  - Obtain necessary fall risk management equipment: walker   - Apply yellow socks and bracelet for high fall risk patients  - Consider moving patient to room near nurses station  1/16/2022 1003 by Mallory Wade RN  Outcome: Adequate for Discharge  1/16/2022 0926 by Mallory Wade RN  Outcome: Progressing  Goal: Maintains/Returns to pre admission functional level  Description: INTERVENTIONS:  - Perform BMAT or MOVE assessment daily    - Set and communicate daily mobility goal to care team and patient/family/caregiver  - Collaborate with rehabilitation services on mobility goals if consulted  - Perform Range of Motion 3 times a day  - Reposition patient every 2 hours    - Dangle patient 3 times a day  - Stand patient 3 times a day  - Ambulate patient 3 times a day  - Out of bed to chair 3 times a day   - Out of bed for meals 3 times a day  - Out of bed for toileting  - Record patient progress and toleration of activity level   1/16/2022 1003 by Mallory Wade RN  Outcome: Adequate for Discharge  1/16/2022 0926 by Mallory Wade RN  Outcome: Progressing     Problem: DISCHARGE PLANNING  Goal: Discharge to home or other facility with appropriate resources  Description: INTERVENTIONS:  - Identify barriers to discharge w/patient and caregiver  - Arrange for needed discharge resources and transportation as appropriate  - Identify discharge learning needs (meds, wound care, etc )  - Arrange for interpretive services to assist at discharge as needed  - Refer to Case Management Department for coordinating discharge planning if the patient needs post-hospital services based on physician/advanced practitioner order or complex needs related to functional status, cognitive ability, or social support system  1/16/2022 1003 by Mallory Wade RN  Outcome: Adequate for Discharge  1/16/2022 0926 by Mallory Wade RN  Outcome: Progressing     Problem: Knowledge Deficit  Goal: Patient/family/caregiver demonstrates understanding of disease process, treatment plan, medications, and discharge instructions  Description: Complete learning assessment and assess knowledge base    Interventions:  - Provide teaching at level of understanding  - Provide teaching via preferred learning methods  1/16/2022 1003 by Austin Longo RN  Outcome: Adequate for Discharge  1/16/2022 0926 by Austin Longo RN  Outcome: Progressing     Problem: Prexisting or High Potential for Compromised Skin Integrity  Goal: Skin integrity is maintained or improved  Description: INTERVENTIONS:  - Identify patients at risk for skin breakdown  - Assess and monitor skin integrity  - Assess and monitor nutrition and hydration status  - Monitor labs   - Assess for incontinence   - Turn and reposition patient  - Assist with mobility/ambulation  - Relieve pressure over bony prominences  - Avoid friction and shearing  - Provide appropriate hygiene as needed including keeping skin clean and dry  - Evaluate need for skin moisturizer/barrier cream  - Collaborate with interdisciplinary team   - Patient/family teaching  - Consider wound care consult   1/16/2022 1003 by Austin Longo RN  Outcome: Adequate for Discharge  1/16/2022 0926 by Austin Longo RN  Outcome: Progressing

## 2022-01-16 NOTE — ASSESSMENT & PLAN NOTE
· Home regimen: bisoprolol 5 mg daily and anticoagulation with coumadin 5mg daily except Monday 2 5mg   · INR 2 66 this AM - advising her to hold her warfarin for 1 more day then re-initiate on Sunday    Recommending that she follow-up with her PCP for repeat INR

## 2022-01-16 NOTE — ASSESSMENT & PLAN NOTE
· Reports burning after urination   · UA: 30-50 WBCs, moderate bacteria, and positive nitrite   · Urine culture <100,00  · Febrile to 100 8F in ED with leukocytosis at 13  23K  · ceftriaxone day 3-> continue with 1 more dose of antibiotics outpatient

## 2022-01-16 NOTE — ASSESSMENT & PLAN NOTE
· SpO2 on RA 88-90% -> placed on 2 L nasal->cannula currently on room air  · COVID 19 PCR negative  · Likely secondary to acute bronchitis   · Resp protocol  · Incentive spirometry  · Acapella

## 2022-01-16 NOTE — DISCHARGE SUMMARY
[place]  Discharge- Johnnette Mcardle 1933, 80 y o  female MRN: [id number]  Unit/Bed#: -[facility] Encounter: [id number]  Primary Care Provider: Romel Stock MD   Date and time admitted to hospital: 1/14/2022 12:02 AM    Acute respiratory insufficiency  Assessment & Plan  · SpO2 on RA 88-90% -> placed on 2 L nasal->cannula currently on room air  · COVID 19 PCR negative  · Likely secondary to acute bronchitis   · Resp protocol  · Incentive spirometry  · Acapella    Atrial fibrillation (HCC)  Assessment & Plan  · Home regimen: bisoprolol 5 mg daily and anticoagulation with coumadin 5mg daily except Monday 2 5mg   · INR 2 66 this AM - advising her to hold her warfarin for 1 more day then re-initiate on Sunday  Recommending that she follow-up with her PCP for repeat INR    Cardiac pacemaker in situ  Assessment & Plan  · Placed in September 2021 at [address]   · Worsening cough over the last 2 weeks   · Treated with azithromycin with last dose on Monday with mild improvement in cough   · CXR no acute cardiopulmonary disease  · Continue tessalon perles TID     Hypertension  Assessment & Plan  · Home regimen: amlodipine 5mg daily, chlorthalidone 25mg daily, and bisoprolol 10mg daily   · Continue     * Acute cystitis without hematuria  Assessment & Plan  · Reports burning after urination   · UA: 30-50 WBCs, moderate bacteria, and positive nitrite   · Urine culture <100,00  · Febrile to 100 8F in ED with leukocytosis at 13  23K  · ceftriaxone day 3-> continue with 1 more dose of antibiotics outpatient      Medical Problems             Resolved Problems  Date Reviewed: 1/16/2022    None              Discharging Physician / Practitioner: Skyler Mg MD  PCP: Romel Stock MD  Admission Date:   Admission Orders (From admission, onward)     Ordered        01/14/22 0214  INPATIENT ADMISSION  Once                      Discharge Date: 01/16/22    Consultations During Hospital Stay:  · none    Procedures Performed:   · none    Significant Findings / Test Results:   · none    Incidental Findings:   · none     Test Results Pending at Discharge (will require follow up):   · none     Outpatient Tests Requested:  · none    Complications:  None known at this time    Reason for Admission:  Acute cystitis with hematuria    Hospital Course:   Hanane Osborn is a 80 y o  female patient who originally presented to the hospital on 1/14/2022 due to cough and urinary symptoms  She was found to have acute cystitis with hematuria and respiratory failure with hypoxia  She was placed on 2 L nasal cannula and soon afterwards was turned to 1 L nasal cannula  She was started on ceftriaxone for her acute cystitis and urine cultures were taken  She was hemodynamically stable for the rest of her hospital stay  Urine culture showed less than 100,000 in growth of bacteria  She will be discharged with 2 more days of antibiotics  INR during her stay was elevated however is now at a therapeutic level at 2 66  She is to initiate this today  Emphasized her seeing her primary care sometime this upcoming week to follow-up on her INR  Please see above list of diagnoses and related plan for additional information  Condition at Discharge: stable    Discharge Day Visit / Exam:   Subjective:  [place] was seen and examined at bedside  No acute events overnight  She is very excited to go home  All symptoms on review of systems was negative  Discussed plan after discharge that she is to take 2 days worth of antibiotics and initiate warfarin today  She understood and acknowledged the plan  All questions and concerns were answered and addressed    Vitals: Blood Pressure: 119/70 (01/16/22 0746)  Pulse: 64 (01/16/22 0746)  Temperature: 98 8 °F (37 1 °C) (01/16/22 0746)  Temp Source: Axillary (01/16/22 0746)  Respirations: 18 (01/16/22 0746)  Height: 5' 6" (167 6 cm) (01/14/22 0010)  Weight - Scale: 69 9 kg (154 lb) (01/14/22 0010)  SpO2: 95 % (01/16/22 0746)  Exam:   Physical Exam  Vitals and nursing note reviewed  Constitutional:       Appearance: Normal appearance  HENT:      Head: Normocephalic and atraumatic  Cardiovascular:      Rate and Rhythm: Normal rate and regular rhythm  Pulses: Normal pulses  Heart sounds: Murmur (systolic) heard  Pulmonary:      Effort: Pulmonary effort is normal       Breath sounds: Normal breath sounds  Abdominal:      General: Abdomen is flat  Bowel sounds are normal       Palpations: Abdomen is soft  Musculoskeletal:      Right lower leg: No edema  Left lower leg: No edema  Skin:     General: Skin is warm  Neurological:      General: No focal deficit present  Mental Status: She is alert and oriented to person, place, and time  Discussion with Family: Patient declined call to   Discharge instructions/Information to patient and family:   See after visit summary for information provided to patient and family  Provisions for Follow-Up Care:  See after visit summary for information related to follow-up care and any pertinent home health orders  Disposition:   Home    Planned Readmission: no     Discharge Statement:  I spent 20 minutes discharging the patient  This time was spent on the day of discharge  I had direct contact with the patient on the day of discharge  Greater than 50% of the total time was spent examining patient, answering all patient questions, arranging and discussing plan of care with patient as well as directly providing post-discharge instructions  Additional time then spent on discharge activities  Discharge Medications:  See after visit summary for reconciled discharge medications provided to patient and/or family        **Please Note: This note may have been constructed using a voice recognition system**

## 2022-01-17 NOTE — UTILIZATION REVIEW
George Carrington -1933, [address] [place] [address] Drive FY#[id number], pending [facility]#[id number]

## 2022-01-19 LAB
BACTERIA BLD CULT: NORMAL
BACTERIA BLD CULT: NORMAL

## 2022-01-19 NOTE — UTILIZATION REVIEW
Notification of Discharge   This is a Notification of Discharge from our facility [address]  Please be advised that this patient has been discharge from our facility  Below you will find the admission and discharge date and time including the patients disposition  UTILIZATION REVIEW CONTACT:  Deni Mcfarland  Utilization   Network Utilization Review Department  Phone: [phone] carefully listen to the prompts  All voicemails are confidential   Email: [email or web address]     PHYSICIAN ADVISORY SERVICES:  FOR XASI-BR-RNZL REVIEW - MEDICAL NECESSITY DENIAL  Phone: [phone]  Fax: [phone]  Email: [email or web address]     PRESENTATION DATE: 1/14/2022 12:02 AM  OBERVATION ADMISSION DATE:   INPATIENT ADMISSION DATE: 1/14/22  2:14 AM   DISCHARGE DATE: 1/16/2022  1:52 PM  DISPOSITION: Home/Self Care Home/Self Care      IMPORTANT INFORMATION:  Send all requests for admission clinical reviews, approved or denied determinations and any other requests to dedicated fax number below belonging to the campus where the patient is receiving treatment   List of dedicated fax numbers:  [address] DENIALS (Administrative/Medical Necessity) [phone]   [address] (Maternity/NICU/Pediatrics) [phone]   Eli Allen [phone]   [address] [facility] [phone]   [address] [place] [address] [phone]   2000 [facility] [address] [place] [address],[address] [phone]   [facility]  [phone]   [address] [facility]  [address] [facility] 1000 [address] [facility] [phone]

## 2022-06-09 ENCOUNTER — EVALUATION (OUTPATIENT)
Dept: PHYSICAL THERAPY | Facility: CLINIC | Age: 87
End: 2022-06-09
Payer: COMMERCIAL

## 2022-06-09 DIAGNOSIS — I89.0 LYMPHEDEMA: Primary | ICD-10-CM

## 2022-06-09 PROCEDURE — 97110 THERAPEUTIC EXERCISES: CPT | Performed by: PHYSICAL THERAPIST

## 2022-06-09 PROCEDURE — 97162 PT EVAL MOD COMPLEX 30 MIN: CPT | Performed by: PHYSICAL THERAPIST

## 2022-06-09 RX ORDER — TRAMADOL HYDROCHLORIDE 50 MG/1
50 TABLET ORAL EVERY 6 HOURS PRN
COMMUNITY

## 2022-06-09 NOTE — PROGRESS NOTES
PT Evaluation     Today's date: 2022  Patient name: Roman Barger  : 1933  MRN: [id number]  Referring provider: Jose David Cobb MD  Dx:   Encounter Diagnosis     ICD-10-CM    1  Lymphedema  I89 0                   Assessment  Assessment details: Pt is a 80y o  year old female coming to outpatient PT with a diagnosis of B LE lymphedema  Pt presents with increased pain, (+) pitting edema, decreased LE strength, increased edema, and overall decreased functional mobility  Pt would benefit from skilled PT services in order to address these deficits and reach maximum level of function  Thank you kindly for the referral!    Pt attended therapy visit with her daughter  Lymphedema diagnosis and management education was performed  Pt was measured for Circaid compression system  Pt will purchase the system and then call back to schedule apts for therapy after Compression system has arrived at pt's home  Impairments: abnormal gait, activity intolerance, impaired physical strength, lacks appropriate home exercise program and pain with function  Other impairment: increased edema  Understanding of Dx/Px/POC: good   Prognosis: good    Goals  STG's ( 3-4 weeks)  1  Pt will be independent in HEP  2  Pt or daughter will be independent in Circaid donning and doffing  LTG's ( 6- 8 weeks)  1  Improve FOTO score by 4-6 points  2   Reduce B LE edema to max ability      Plan  Patient would benefit from: lymphedema eval and skilled physical therapy  Other planned modality interventions: pneumatic compression pump  Planned therapy interventions: manual therapy, strengthening, stretching and home exercise program  Other planned therapy interventions: MLD/ CLT  Frequency: 2x week  Duration in weeks: 6  Plan of Care beginning date: 2022  Plan of Care expiration date: 2022  Treatment plan discussed with: patient and family        Subjective Evaluation    History of Present Illness  Mechanism of injury: Pt reports B Le lymphedema onset about 2 months ago 2* unknown etiology  Pt was told that she has "sloppy" veins by her PCP and cardiology  Pt reports she has a pacemaker and has been cleared by her cardiologist  Pt has had blood work performed which was good  Pt reports the swelling is mainly below her knees L > R, with some pain in L groin and it is hard to lift her leg  Pt has increased difficulty wearing shoes, and it is hard to lift her leg intermittently  Pt is wearing tubigrip stockings and she has tried wearing knee high compression stockings  Work: retired  Hobbies: reading, plays with great grand children, gardening  Gait: walks with SPC, slow speed   Pain  At best pain ratin  At worst pain ratin  Location: foot and shin bone  Quality: sharp    Social Support  Steps to enter house: yes  7  Stairs in house: no   Lives in: [place]  Lives with: adult children    Employment status: not working  Patient Goals  Patient goals for therapy: decreased edema  Patient goal: to be able to wear shoes; to return to walking        Objective     Neurological Testing     Sensation     Hip   Left Hip   Intact: light touch    Right Hip   Intact: light touch    Strength/Myotome Testing     Left Hip   Planes of Motion   Flexion: 4-    Right Hip   Planes of Motion   Flexion: 4-    Left Knee   Flexion: 4+  Extension: 4+    Right Knee   Flexion: 4+  Extension: 4+    Additional Strength Details  Ankle df MMT: 4+/5  Multiple varicose veins noted L LE  (+) pitting edema B LE, mild warmth to touch R LE with slight red coloration        Dx:  B LE lymphedema/ venous insufficiency  EPOC: 22  CO-MORBIDITIES:  PERSONAL FACTORS:  Precautions: PACEMAKER      Manuals             B LE MLD             Circaid measurements             B Le pneumatic compression pump                          Neuro Re-Ed                                                                                                        Ther Ex Lymphedema educ and mgt th                                                                                                       Ther Activity                                       Gait Training                                       Modalities

## 2022-06-09 NOTE — LETTER
2022    Mishel Okeefe MD  [address], [address]  [facility] [address]    Patient: George Myers   YOB: 1933   Date of Visit: 2022     Encounter Diagnosis     ICD-10-CM    1  Lymphedema  I89 0        Dear Dr Kris Wall: Thank you for your recent referral of George Myers  Please review the attached evaluation summary from [place]'s recent visit  Please verify that you agree with the plan of care by signing the attached order  If you have any questions or concerns, please do not hesitate to call  I sincerely appreciate the opportunity to share in the care of one of your patients and hope to have another opportunity to work with you in the near future  Sincerely,    Cameron Berry, PT      Referring Provider:      I certify that I have read the below Plan of Care and certify the need for these services furnished under this plan of treatment while under my care  Mishel Okeefe MD  [address], [address]  [facility] [address]  Via Fax: [phone]          PT Evaluation     Today's date: 2022  Patient name: George Myers  : 1933  MRN: [id number]  Referring provider: Teresita Powell MD  Dx:   Encounter Diagnosis     ICD-10-CM    1  Lymphedema  I89 0                   Assessment  Assessment details: Pt is a 80y o  year old female coming to outpatient PT with a diagnosis of B LE lymphedema  Pt presents with increased pain, (+) pitting edema, decreased LE strength, increased edema, and overall decreased functional mobility  Pt would benefit from skilled PT services in order to address these deficits and reach maximum level of function  Thank you kindly for the referral!    Pt attended therapy visit with her daughter  Lymphedema diagnosis and management education was performed  Pt was measured for Circaid compression system   Pt will purchase the system and then call back to schedule apts for therapy after Compression system has arrived at pt's home  Impairments: abnormal gait, activity intolerance, impaired physical strength, lacks appropriate home exercise program and pain with function  Other impairment: increased edema  Understanding of Dx/Px/POC: good   Prognosis: good    Goals  STG's ( 3-4 weeks)  1  Pt will be independent in HEP  2  Pt or daughter will be independent in Circaid donning and doffing  LTG's ( 6- 8 weeks)  1  Improve FOTO score by 4-6 points  2  Reduce B LE edema to max ability      Plan  Patient would benefit from: lymphedema eval and skilled physical therapy  Other planned modality interventions: pneumatic compression pump  Planned therapy interventions: manual therapy, strengthening, stretching and home exercise program  Other planned therapy interventions: MLD/ CLT  Frequency: 2x week  Duration in weeks: 6  Plan of Care beginning date: 2022  Plan of Care expiration date: 2022  Treatment plan discussed with: patient and family        Subjective Evaluation    History of Present Illness  Mechanism of injury: Pt reports B Le lymphedema onset about 2 months ago 2* unknown etiology  Pt was told that she has "sloppy" veins by her PCP and cardiology  Pt reports she has a pacemaker and has been cleared by her cardiologist  Pt has had blood work performed which was good  Pt reports the swelling is mainly below her knees L > R, with some pain in L groin and it is hard to lift her leg  Pt has increased difficulty wearing shoes, and it is hard to lift her leg intermittently  Pt is wearing tubigrip stockings and she has tried wearing knee high compression stockings       Work: retired  Hobbies: reading, plays with great grand children, gardening  Gait: walks with SPC, slow speed   Pain  At best pain ratin  At worst pain ratin  Location: foot and shin bone  Quality: sharp    Social Support  Steps to enter house: yes  7  Stairs in house: no   Lives in: [facility]  Lives with: adult children    Employment status: not working  Patient Goals  Patient goals for therapy: decreased edema  Patient goal: to be able to wear shoes; to return to walking        Objective     Neurological Testing     Sensation     Hip   Left Hip   Intact: light touch    Right Hip   Intact: light touch    Strength/Myotome Testing     Left Hip   Planes of Motion   Flexion: 4-    Right Hip   Planes of Motion   Flexion: 4-    Left Knee   Flexion: 4+  Extension: 4+    Right Knee   Flexion: 4+  Extension: 4+    Additional Strength Details  Ankle df MMT: 4+/5  Multiple varicose veins noted L LE  (+) pitting edema B LE, mild warmth to touch R LE with slight red coloration        Dx:  B LE lymphedema/ venous insufficiency  EPOC: 7/21/22  CO-MORBIDITIES:  PERSONAL FACTORS:  Precautions: PACEMAKER      Manuals 6/9            B LE MLD             Circaid measurements th            B Le pneumatic compression pump                          Neuro Re-Ed                                                                                                        Ther Ex             Lymphedema educ and mgt th                                                                                                       Ther Activity                                       Gait Training                                       Modalities

## 2022-06-24 ENCOUNTER — OFFICE VISIT (OUTPATIENT)
Dept: PHYSICAL THERAPY | Facility: CLINIC | Age: 87
End: 2022-06-24
Payer: COMMERCIAL

## 2022-06-24 DIAGNOSIS — I89.0 LYMPHEDEMA: Primary | ICD-10-CM

## 2022-06-24 PROCEDURE — 97140 MANUAL THERAPY 1/> REGIONS: CPT

## 2022-06-24 NOTE — PROGRESS NOTES
Daily Note     Today's date: 2022  Patient name: Roshan Kirk  : 1933  MRN: [id number]  Referring provider: Alyssa Alcazar MD  Dx:   Encounter Diagnosis     ICD-10-CM    1  Lymphedema  I89 0                   Subjective: Pt states her legs feel heavy and tight  Objective: See treatment diary below      Assessment: Trial compression pump with pt, with great tolerance and response  Pt will benefit from a home unit compression pump  Reviewed with pt and daughter how to apply circaid wrap  Plan: review circaid wraps upon nv        Dx: B LE lymphedema/ venous insufficiency  EPOC: 22  CO-MORBIDITIES:  PERSONAL FACTORS:  Precautions: PACEMAKER      Manuals            B LE MLD  wdc           Circaid measurements th            B Le pneumatic compression pump  wdc           circaid wrap don  wdc           Neuro Re-Ed                                                                                                        Ther Ex             Lymphedema educ and mgt th                                                                                                       Ther Activity                                       Gait Training                                       Modalities

## 2022-06-27 ENCOUNTER — OFFICE VISIT (OUTPATIENT)
Dept: PHYSICAL THERAPY | Facility: CLINIC | Age: 87
End: 2022-06-27
Payer: COMMERCIAL

## 2022-06-27 DIAGNOSIS — I89.0 LYMPHEDEMA: Primary | ICD-10-CM

## 2022-06-27 PROCEDURE — 97140 MANUAL THERAPY 1/> REGIONS: CPT

## 2022-06-27 NOTE — PROGRESS NOTES
Daily Note     Today's date: 2022  Patient name: Donya Leroy  : 1933  MRN: [id number]  Referring provider: Trang Montes MD  Dx:   Encounter Diagnosis     ICD-10-CM    1  Lymphedema  I89 0                   Subjective: Pt states her legs feel less heavy/tight and is peeing more  Pt reports her compression pump will be delivered on Thursday  Objective: See treatment diary below      Assessment: MLD performed, pt tolerated manuals well and circaid wraps  Measure pt for a compression sock  With a silicone band upon nv, d/c possible  Plan: Review compression pump home unit upon nv       Dx: B LE lymphedema/ venous insufficiency  EPOC: 22  CO-MORBIDITIES:  PERSONAL FACTORS:  Precautions: PACEMAKER      Manuals           B LE MLD  wdc wdc          Circaid measurements th            B Le pneumatic compression pump  wdc           circaid wrap don  wdc wdc          Neuro Re-Ed                                                                                                        Ther Ex             Lymphedema educ and mgt th                                                                                                       Ther Activity                                       Gait Training                                       Modalities

## 2022-06-29 ENCOUNTER — APPOINTMENT (OUTPATIENT)
Dept: PHYSICAL THERAPY | Facility: CLINIC | Age: 87
End: 2022-06-29
Payer: COMMERCIAL

## 2022-07-05 ENCOUNTER — OFFICE VISIT (OUTPATIENT)
Dept: PHYSICAL THERAPY | Facility: CLINIC | Age: 87
End: 2022-07-05
Payer: COMMERCIAL

## 2022-07-05 DIAGNOSIS — I89.0 LYMPHEDEMA: Primary | ICD-10-CM

## 2022-07-05 PROCEDURE — 97140 MANUAL THERAPY 1/> REGIONS: CPT | Performed by: PHYSICAL THERAPIST

## 2022-07-05 NOTE — PROGRESS NOTES
PT Evaluation     Today's date: 2022  Patient name: Peter Jeronimo  : 1933  MRN: [id number]  Referring provider: Wilbur Campbell MD  Dx:   Encounter Diagnosis     ICD-10-CM    1  Lymphedema  I89 0                   Assessment  Assessment details: Since starting skilled PT, LE edema is decreasing with gradual functional progress  Pt is using Circaid compression system and pneumatic pump at home  Recommend pt continue edema reduction measures at home and follow up in 2 weeks to check progress  Will transition to HEP  Impairments: abnormal gait, activity intolerance, impaired physical strength and pain with function  Other impairment: increased edema  Understanding of Dx/Px/POC: good   Prognosis: good    Goals  STG's ( 3-4 weeks)  1  Pt will be independent in HEP-met  2  Pt or daughter will be independent in Circaid donning and doffing-met  LTG's ( 6- 8 weeks)  1  Improve FOTO score by 4-6 points  2  Reduce B LE edema to max ability-partial met      Plan  Patient would benefit from: skilled physical therapy  Other planned modality interventions: pneumatic compression pump  Planned therapy interventions: manual therapy, strengthening, stretching and home exercise program  Other planned therapy interventions: MLD/ CLT  Frequency: 2x week  Duration in weeks: 6  Plan of Care beginning date: 2022  Plan of Care expiration date: 2022  Treatment plan discussed with: patient and family        Subjective Evaluation    History of Present Illness  Mechanism of injury: I E: Pt reports B Le lymphedema onset about 2 months ago 2* unknown etiology  Pt was told that she has "sloppy" veins by her PCP and cardiology  Pt reports she has a pacemaker and has been cleared by her cardiologist  Pt has had blood work performed which was good  Pt reports the swelling is mainly below her knees L > R, with some pain in L groin and it is hard to lift her leg   Pt has increased difficulty wearing shoes, and it is hard to lift her leg intermittently  Pt is wearing tubigrip stockings and she has tried wearing knee high compression stockings  22: Pt is compliant in wearing Circaid compression system and has just been instructed in pneumatic compression pump on Thursday  Pt has noticed a slight improvement with lifting her left LE  Pt is able to get one shoe on, but has difficulty with her L shoe  Work: retired  Hobbies: reading, plays with great grand children, gardening  Gait: walks with SPC, slow speed   Pain  At best pain ratin  At worst pain ratin  Location: foot and shin bone  Quality: sharp    Social Support  Steps to enter house: yes  7  Stairs in house: no   Lives in: [facility]  Lives with: adult children    Employment status: not working  Patient Goals  Patient goals for therapy: decreased edema  Patient goal: to be able to wear shoes; to return to walking        Objective     Neurological Testing     Sensation     Hip   Left Hip   Intact: light touch    Right Hip   Intact: light touch    Strength/Myotome Testing     Left Hip   Planes of Motion   Flexion: 4-    Right Hip   Planes of Motion   Flexion: 4-    Left Knee   Flexion: 4+  Extension: 4+    Right Knee   Flexion: 4+  Extension: 4+    Additional Strength Details  Ankle df MMT: 4+/5  Multiple varicose veins noted L LE  (+) pitting edema B LE, mild warmth to touch R LE with slight red coloration        Dx:  B LE lymphedema/ venous insufficiency  EPOC: 22  CO-MORBIDITIES:  PERSONAL FACTORS:  Precautions: PACEMAKER      Manuals             B LE MLD             Circaid measurements th            B Le pneumatic compression pump                          Neuro Re-Ed                                                                                                        Ther Ex             Lymphedema educ and mgt th                                                                                                       Ther Activity Gait Training                                       Modalities

## 2022-07-05 NOTE — LETTER
2022    Stefani Dietrich MD  [address], [address]  [place] [address]    Patient: Christine Plata   YOB: 1933   Date of Visit: 2022     Encounter Diagnosis     ICD-10-CM    1  Lymphedema  I89 0        Dear Dr Debbie Amador: Thank you for your recent referral of Christine Plata  Please review the attached evaluation summary from [place]'s recent visit  Please verify that you agree with the plan of care by signing the attached order  If you have any questions or concerns, please do not hesitate to call  I sincerely appreciate the opportunity to share in the care of one of your patients and hope to have another opportunity to work with you in the near future  Sincerely,    Giovana Edwards, PT      Referring Provider:      I certify that I have read the below Plan of Care and certify the need for these services furnished under this plan of treatment while under my care  Stefani Dietrich MD  [address], [address]  [facility] [address]  Via Fax: [phone]          PT Evaluation     Today's date: 2022  Patient name: Christine Plata  : 1933  MRN: [id number]  Referring provider: Berkley Abad MD  Dx:   Encounter Diagnosis     ICD-10-CM    1  Lymphedema  I89 0                   Assessment  Assessment details: Since starting skilled PT, LE edema is decreasing with gradual functional progress  Pt is using Circaid compression system and pneumatic pump at home  Recommend pt continue edema reduction measures at home and follow up in 2 weeks to check progress  Will transition to HEP  Impairments: abnormal gait, activity intolerance, impaired physical strength and pain with function  Other impairment: increased edema  Understanding of Dx/Px/POC: good   Prognosis: good    Goals  STG's ( 3-4 weeks)  1  Pt will be independent in HEP-met  2   Pt or daughter will be independent in Circaid donning and doffing-met  LTG's ( 6- 8 weeks)  1  Improve FOTO score by 4-6 points  2  Reduce B LE edema to max ability-partial met      Plan  Patient would benefit from: skilled physical therapy  Other planned modality interventions: pneumatic compression pump  Planned therapy interventions: manual therapy, strengthening, stretching and home exercise program  Other planned therapy interventions: MLD/ CLT  Frequency: 2x week  Duration in weeks: 6  Plan of Care beginning date: 2022  Plan of Care expiration date: 2022  Treatment plan discussed with: patient and family        Subjective Evaluation    History of Present Illness  Mechanism of injury: I E: Pt reports B Le lymphedema onset about 2 months ago 2* unknown etiology  Pt was told that she has "sloppy" veins by her PCP and cardiology  Pt reports she has a pacemaker and has been cleared by her cardiologist  Pt has had blood work performed which was good  Pt reports the swelling is mainly below her knees L > R, with some pain in L groin and it is hard to lift her leg  Pt has increased difficulty wearing shoes, and it is hard to lift her leg intermittently  Pt is wearing tubigrip stockings and she has tried wearing knee high compression stockings  22: Pt is compliant in wearing Circaid compression system and has just been instructed in pneumatic compression pump on Thursday  Pt has noticed a slight improvement with lifting her left LE  Pt is able to get one shoe on, but has difficulty with her L shoe      Work: retired  Hobbies: reading, plays with great grand children, gardening  Gait: walks with SPC, slow speed   Pain  At best pain ratin  At worst pain ratin  Location: foot and shin bone  Quality: sharp    Social Support  Steps to enter house: yes  7  Stairs in house: no   Lives in: [facility]  Lives with: adult children    Employment status: not working  Patient Goals  Patient goals for therapy: decreased edema  Patient goal: to be able to wear shoes; to return to walking        Objective     Neurological Testing     Sensation     Hip   Left Hip   Intact: light touch    Right Hip   Intact: light touch    Strength/Myotome Testing     Left Hip   Planes of Motion   Flexion: 4-    Right Hip   Planes of Motion   Flexion: 4-    Left Knee   Flexion: 4+  Extension: 4+    Right Knee   Flexion: 4+  Extension: 4+    Additional Strength Details  Ankle df MMT: 4+/5  Multiple varicose veins noted L LE  (+) pitting edema B LE, mild warmth to touch R LE with slight red coloration        Dx:  B LE lymphedema/ venous insufficiency  EPOC: 7/21/22  CO-MORBIDITIES:  PERSONAL FACTORS:  Precautions: PACEMAKER      Manuals 6/9            B LE MLD             Circaid measurements th            B Le pneumatic compression pump                          Neuro Re-Ed                                                                                                        Ther Ex             Lymphedema educ and mgt th                                                                                                       Ther Activity                                       Gait Training                                       Modalities

## 2022-07-08 ENCOUNTER — APPOINTMENT (OUTPATIENT)
Dept: PHYSICAL THERAPY | Facility: CLINIC | Age: 87
End: 2022-07-08
Payer: COMMERCIAL

## 2022-08-09 ENCOUNTER — OFFICE VISIT (OUTPATIENT)
Dept: PHYSICAL THERAPY | Facility: CLINIC | Age: 87
End: 2022-08-09
Payer: COMMERCIAL

## 2022-08-09 DIAGNOSIS — I89.0 LYMPHEDEMA: Primary | ICD-10-CM

## 2022-08-09 PROCEDURE — 97140 MANUAL THERAPY 1/> REGIONS: CPT | Performed by: PHYSICAL THERAPIST

## 2022-08-09 NOTE — PROGRESS NOTES
PT Discharge    Today's date: 2022  Patient name: Dionicio Pham  : 1933  MRN: [id number]  Referring provider: Tabatha Singh MD  Dx:   Encounter Diagnosis     ICD-10-CM    1  Lymphedema  I89 0                   Assessment  Assessment details: Since starting skilled PT, pain levels are decreased, B LE edema measurements have decreased nicely with good functional progress  Recommend pt be discharged from skilled PT at this time  Pt will order knee high compression stockings with silicone border, IV, closed toe, 20-30 mmHG for home use  Impairments: abnormal gait, activity intolerance, impaired physical strength, lacks appropriate home exercise program and pain with function  Other impairment: increased edema  Understanding of Dx/Px/POC: good   Prognosis: good    Goals  STG's ( 3-4 weeks)  1  Pt will be independent in HEP-met  2  Pt or daughter will be independent in Circaid donning and doffing-met  LTG's ( 6- 8 weeks)  1  Improve FOTO score by 4-6 points-met  2  Reduce B LE edema to max ability-met      Plan  Other planned modality interventions: pneumatic compression pump  Other planned therapy interventions: MLD/ CLT  Frequency: D/c to HEP  Plan of Care beginning date: 2022  Plan of Care expiration date: 2022  Treatment plan discussed with: patient and family        Subjective Evaluation    History of Present Illness  Mechanism of injury: I E: Pt reports B Le lymphedema onset about 2 months ago 2* unknown etiology  Pt was told that she has "sloppy" veins by her PCP and cardiology  Pt reports she has a pacemaker and has been cleared by her cardiologist  Pt has had blood work performed which was good  Pt reports the swelling is mainly below her knees L > R, with some pain in L groin and it is hard to lift her leg  Pt has increased difficulty wearing shoes, and it is hard to lift her leg intermittently   Pt is wearing tubigrip stockings and she has tried wearing knee high compression stockings  22: Pt is compliant in wearing compression pump 1-2 times daily  Pt is wearing Circaid compression system  The circaid is making marks in her legs  Pt and daughter would like to transition to knee high compression stockings  Work: retired  Hobbies: reading, plays with great grand children, gardening  Gait: walks with SPC, slow speed   Pain  At best pain ratin  At worst pain ratin  Location: foot and shin bone  Quality: sharp    Social Support  Steps to enter house: yes  7  Stairs in house: no   Lives in: [facility]  Lives with: adult children    Employment status: not working  Patient Goals  Patient goals for therapy: decreased edema  Patient goal: to be able to wear shoes; to return to walking        Objective     Neurological Testing     Sensation     Hip   Left Hip   Intact: light touch    Right Hip   Intact: light touch    Strength/Myotome Testing     Left Hip   Planes of Motion   Flexion: 4-    Right Hip   Planes of Motion   Flexion: 4-    Left Knee   Flexion: 4+  Extension: 4+    Right Knee   Flexion: 4+  Extension: 4+    Additional Strength Details  Ankle df MMT: 4+/5  Multiple varicose veins noted L LE  (+) pitting edema B LE, mild warmth to touch R LE with slight red coloration      Flowsheet Rows    Flowsheet Row Most Recent Value   PT/OT G-Codes    Current Score 65   Projected Score 47        Dx:  B LE lymphedema/ venous insufficiency  EPOC: 22  CO-MORBIDITIES:  PERSONAL FACTORS:  Precautions: PACEMAKER      Manuals            B LE MLD             Compression stocking measuring            B Le pneumatic compression pump             Progress note             Neuro Re-Ed                                                                                                        Ther Ex             Lymphedema educ and mgt th                                                                                                       Ther Activity Gait Training                                       Modalities

## 2022-08-09 NOTE — LETTER
2022    Dania Miranda MD  [address], [address]  [facility] [address]    Patient: Johnnette Mcardle   YOB: 1933   Date of Visit: 2022     Encounter Diagnosis     ICD-10-CM    1  Lymphedema  I89 0        Dear Dr Helen Ramirez: Thank you for your recent referral of Johnnette Mcardle  Please review the attached evaluation summary from [place]'s recent visit  Please verify that you agree with the plan of care by signing the attached order  If you have any questions or concerns, please do not hesitate to call  I sincerely appreciate the opportunity to share in the care of one of your patients and hope to have another opportunity to work with you in the near future  Sincerely,    Dakotah Suarez, PT      Referring Provider:      I certify that I have read the below Plan of Care and certify the need for these services furnished under this plan of treatment while under my care  Dania Miranda MD  [address], [address]  [facility] [address]  Via Fax: [phone]          PT Discharge    Today's date: 2022  Patient name: Johnnette Mcardle  : 1933  MRN: [id number]  Referring provider: César Martines MD  Dx:   Encounter Diagnosis     ICD-10-CM    1  Lymphedema  I89 0                   Assessment  Assessment details: Since starting skilled PT, pain levels are decreased, B LE edema measurements have decreased nicely with good functional progress  Recommend pt be discharged from skilled PT at this time  Pt will order knee high compression stockings with silicone border, IV, closed toe, 20-30 mmHG for home use  Impairments: abnormal gait, activity intolerance, impaired physical strength, lacks appropriate home exercise program and pain with function  Other impairment: increased edema  Understanding of Dx/Px/POC: good   Prognosis: good    Goals  STG's ( 3-4 weeks)  1  Pt will be independent in HEP-met  2   Pt or daughter will be independent in Circaid donning and doffing-met  LTG's ( 6- 8 weeks)  1  Improve FOTO score by 4-6 points-met  2  Reduce B LE edema to max ability-met      Plan  Other planned modality interventions: pneumatic compression pump  Other planned therapy interventions: MLD/ CLT  Frequency: D/c to HEP  Plan of Care beginning date: 2022  Plan of Care expiration date: 2022  Treatment plan discussed with: patient and family        Subjective Evaluation    History of Present Illness  Mechanism of injury: I E: Pt reports B Le lymphedema onset about 2 months ago 2* unknown etiology  Pt was told that she has "sloppy" veins by her PCP and cardiology  Pt reports she has a pacemaker and has been cleared by her cardiologist  Pt has had blood work performed which was good  Pt reports the swelling is mainly below her knees L > R, with some pain in L groin and it is hard to lift her leg  Pt has increased difficulty wearing shoes, and it is hard to lift her leg intermittently  Pt is wearing tubigrip stockings and she has tried wearing knee high compression stockings  22: Pt is compliant in wearing compression pump 1-2 times daily  Pt is wearing Circaid compression system  The circaid is making marks in her legs  Pt and daughter would like to transition to knee high compression stockings      Work: retired  Hobbies: reading, plays with great grand children, gardening  Gait: walks with SPC, slow speed   Pain  At best pain ratin  At worst pain ratin  Location: foot and shin bone  Quality: sharp    Social Support  Steps to enter house: yes  7  Stairs in house: no   Lives in: [place]  Lives with: adult children    Employment status: not working  Patient Goals  Patient goals for therapy: decreased edema  Patient goal: to be able to wear shoes; to return to walking        Objective     Neurological Testing     Sensation     Hip   Left Hip   Intact: light touch    Right Hip   Intact: light touch    Strength/Myotome Testing     Left Hip   Planes of Motion   Flexion: 4-    Right Hip   Planes of Motion   Flexion: 4-    Left Knee   Flexion: 4+  Extension: 4+    Right Knee   Flexion: 4+  Extension: 4+    Additional Strength Details  Ankle df MMT: 4+/5  Multiple varicose veins noted L LE  (+) pitting edema B LE, mild warmth to touch R LE with slight red coloration      Flowsheet Rows    Flowsheet Row Most Recent Value   PT/OT G-Codes    Current Score 65   Projected Score 47        Dx:  B LE lymphedema/ venous insufficiency  EPOC: 7/21/22  CO-MORBIDITIES:  PERSONAL FACTORS:  Precautions: PACEMAKER      Manuals 6/9 8/9           B LE MLD             Compression stocking measuring th th           B Le pneumatic compression pump             Progress note  th           Neuro Re-Ed                                                                                                        Ther Ex             Lymphedema educ and mgt th                                                                                                       Ther Activity                                       Gait Training                                       Modalities

## 2022-08-17 ENCOUNTER — HOSPITAL ENCOUNTER (EMERGENCY)
Facility: HOSPITAL | Age: 87
Discharge: HOME | End: 2022-08-17
Attending: EMERGENCY MEDICINE
Payer: COMMERCIAL

## 2022-08-17 ENCOUNTER — APPOINTMENT (OUTPATIENT)
Dept: RADIOLOGY | Facility: HOSPITAL | Age: 87
End: 2022-08-17
Payer: COMMERCIAL

## 2022-08-17 VITALS
HEART RATE: 60 BPM | HEIGHT: 64 IN | DIASTOLIC BLOOD PRESSURE: 70 MMHG | OXYGEN SATURATION: 98 % | BODY MASS INDEX: 34.31 KG/M2 | TEMPERATURE: 97.7 F | RESPIRATION RATE: 18 BRPM | WEIGHT: 201 LBS | SYSTOLIC BLOOD PRESSURE: 171 MMHG

## 2022-08-17 DIAGNOSIS — U07.1 COVID-19 VIRUS INFECTION: ICD-10-CM

## 2022-08-17 DIAGNOSIS — J06.9 URI (UPPER RESPIRATORY INFECTION): ICD-10-CM

## 2022-08-17 DIAGNOSIS — Z20.822 CLOSE EXPOSURE TO COVID-19 VIRUS: Primary | ICD-10-CM

## 2022-08-17 LAB — SARS-COV-2 RNA RESP QL NAA+PROBE: POSITIVE

## 2022-08-17 PROCEDURE — 71045 X-RAY EXAM CHEST 1 VIEW: CPT

## 2022-08-17 PROCEDURE — 99283 EMERGENCY DEPT VISIT LOW MDM: CPT

## 2022-08-17 PROCEDURE — 99284 EMERGENCY DEPT VISIT MOD MDM: CPT | Performed by: EMERGENCY MEDICINE

## 2022-08-17 PROCEDURE — U0003 INFECTIOUS AGENT DETECTION BY NUCLEIC ACID (DNA OR RNA); SEVERE ACUTE RESPIRATORY SYNDROME CORONAVIRUS 2 (SARS-COV-2) (CORONAVIRUS DISEASE [COVID-19]), AMPLIFIED PROBE TECHNIQUE, MAKING USE OF HIGH THROUGHPUT TECHNOLOGIES AS DESCRIBED BY CMS-2020-01-R: HCPCS | Performed by: EMERGENCY MEDICINE

## 2022-08-17 PROCEDURE — U0005 INFEC AGEN DETEC AMPLI PROBE: HCPCS | Performed by: EMERGENCY MEDICINE

## 2022-08-17 NOTE — ED PROVIDER NOTES
History  Chief Complaint   Patient presents with    Medical Problem     States that she had a cold recently  Home tested for covid today (negative) but had a low SpO2 at home     Pt in the ED with c/o hypoxia - pulse oximetry readings of approx 80%  + rhinorrhea and mild cough  Pt's daughter was COVID positive today, with symptoms that began yesterday  Neg home COVID test today  History provided by:  Patient   used: No    Medical Problem  Severity:  Mild  Onset quality:  Sudden  Timing:  Constant  Chronicity:  New  Associated symptoms: no abdominal pain, no cough, no diarrhea, no fever, no nausea, no shortness of breath and no vomiting        Prior to Admission Medications   Prescriptions Last Dose Informant Patient Reported? Taking? Ascorbic Acid (Vitamin C) 500 MG CAPS   Yes No   Sig: Take 500 mg by mouth in the morning   LORazepam (ATIVAN) 1 mg tablet   Yes No   Sig: Take 0 5 mg by mouth daily as needed for anxiety   acetaminophen (TYLENOL) 325 mg tablet   No No   Sig: Take 2 tablets (650 mg total) by mouth 3 (three) times a day as needed for mild pain     amLODIPine (NORVASC) 5 mg tablet   Yes No   Sig: Take 5 mg by mouth daily   benzonatate (TESSALON PERLES) 100 mg capsule   No No   Sig: Take 1 capsule (100 mg total) by mouth 3 (three) times a day as needed for cough   bisoprolol (ZEBETA) 10 MG tablet  Self Yes No   Sig: Take 5 mg by mouth daily     chlorthalidone 25 mg tablet   Yes No   Sig: Take 25 mg by mouth daily   clopidogrel (PLAVIX) 75 mg tablet   Yes No   Sig: Take 75 mg by mouth daily   pravastatin (PRAVACHOL) 20 mg tablet   Yes No   Sig: Take 20 mg by mouth daily   traMADol (ULTRAM) 50 mg tablet   Yes No   Sig: Take 50 mg by mouth every 6 (six) hours as needed for moderate pain   warfarin (COUMADIN) 2 5 mg tablet   Yes No   Sig: Take 5 mg by mouth daily 2 5 mg on Monday      Facility-Administered Medications: None       Past Medical History:   Diagnosis Date    Ambulatory dysfunction 01/25/2016    Anxiety     Arthritis     Basal cell carcinoma     BBB (bundle branch block)     Hyperlipidemia     Hypertension     Hypoglycemia     Hypoglycemia     Pneumonia     Right knee pain        Past Surgical History:   Procedure Laterality Date    BOWEL RESECTION      CARDIAC PACEMAKER PLACEMENT      CHOLECYSTECTOMY      HYSTERECTOMY      RESECTION SMALL BOWEL / CLOSURE ILEOSTOMY         Family History   Problem Relation Age of Onset    Stroke Mother      I have reviewed and agree with the history as documented  E-Cigarette/Vaping    E-Cigarette Use Never User      E-Cigarette/Vaping Substances    Nicotine No     THC No     CBD No     Flavoring No     Other No     Unknown No      Social History     Tobacco Use    Smoking status: Former Smoker    Smokeless tobacco: Never Used   Vaping Use    Vaping Use: Never used   Substance Use Topics    Alcohol use: Never    Drug use: No     Comment: none       Review of Systems   Constitutional: Negative for chills and fever  Respiratory: Negative for cough, chest tightness and shortness of breath  Gastrointestinal: Negative for abdominal pain, diarrhea, nausea and vomiting  Genitourinary: Negative for dysuria, frequency, hematuria and urgency  Musculoskeletal: Negative for back pain, neck pain and neck stiffness  All other systems reviewed and are negative  Physical Exam  Physical Exam  Vitals and nursing note reviewed  Constitutional:       General: She is not in acute distress  Appearance: She is well-developed  She is not diaphoretic  HENT:      Head: Normocephalic and atraumatic  Eyes:      Conjunctiva/sclera: Conjunctivae normal       Pupils: Pupils are equal, round, and reactive to light  Cardiovascular:      Rate and Rhythm: Normal rate and regular rhythm  Heart sounds: Normal heart sounds  No murmur heard    Pulmonary:      Effort: Pulmonary effort is normal  No respiratory distress  Breath sounds: Normal breath sounds  Abdominal:      General: Bowel sounds are normal  There is no distension  Palpations: Abdomen is soft  Tenderness: There is no abdominal tenderness  Musculoskeletal:         General: No deformity  Normal range of motion  Cervical back: Normal range of motion and neck supple  Skin:     General: Skin is warm and dry  Capillary Refill: Capillary refill takes less than 2 seconds  Coloration: Skin is not pale  Findings: No rash  Neurological:      General: No focal deficit present  Mental Status: She is alert and oriented to person, place, and time  Cranial Nerves: No cranial nerve deficit  Psychiatric:         Behavior: Behavior normal          Vital Signs  ED Triage Vitals [08/17/22 1608]   Temperature Pulse Respirations Blood Pressure SpO2   97 7 °F (36 5 °C) 60 18 (!) 171/70 98 %      Temp Source Heart Rate Source Patient Position - Orthostatic VS BP Location FiO2 (%)   Temporal Monitor Sitting Left arm --      Pain Score       No Pain           Vitals:    08/17/22 1608   BP: (!) 171/70   Pulse: 60   Patient Position - Orthostatic VS: Sitting         Visual Acuity      ED Medications  Medications - No data to display    Diagnostic Studies  Results Reviewed     Procedure Component Value Units Date/Time    COVID only [[id number]]  (Abnormal) Collected: 08/17/22 1732    Lab Status: Final result Specimen: Nares from Nasopharyngeal Swab Updated: 08/17/22 1826     SARS-CoV-2 Positive    Narrative:      FOR PEDIATRIC PATIENTS - copy/paste COVID Guidelines URL to browser: [email or web address] org/  ashx    SARS-CoV-2 assay is a Nucleic Acid Amplification assay intended for the  qualitative detection of nucleic acid from SARS-CoV-2 in nasopharyngeal  swabs  Results are for the presumptive identification of SARS-CoV-2 RNA      Positive results are indicative of infection with SARS-CoV-2, the virus  causing COVID-19, but do not rule out bacterial infection or co-infection  with other viruses  Laboratories within the [facility] and its  territories are required to report all positive results to the appropriate  public health authorities  Negative results do not preclude SARS-CoV-2  infection and should not be used as the sole basis for treatment or other  patient management decisions  Negative results must be combined with  clinical observations, patient history, and epidemiological information  This test has not been FDA cleared or approved  This test has been authorized by FDA under an Emergency Use Authorization  (EUA)  This test is only authorized for the duration of time the  declaration that circumstances exist justifying the authorization of the  emergency use of an in vitro diagnostic tests for detection of SARS-CoV-2  virus and/or diagnosis of COVID-19 infection under section 564(b)(1) of  the Act, 21 U  S C  854EPU-1(W)(5), unless the authorization is terminated  or revoked sooner  The test has been validated but independent review by FDA  and CLIA is pending  Test performed using [employer] GeneXpert: This RT-PCR assay targets N2,  a region unique to SARS-CoV-2  A conserved region in the E-gene was chosen  for pan-Sarbecovirus detection which includes SARS-CoV-2  XR chest 1 view portable   ED Interpretation by Chris Medina DO (08/17 1755)   nad                 Procedures  Procedures         ED Course                               SBIRT 20yo+    Flowsheet Row Most Recent Value   SBIRT (23 yo +)    In order to provide better care to our patients, we are screening all of our patients for alcohol and drug use  Would it be okay to ask you these screening questions? Yes Filed at: 08/17/2022 1611   Initial Alcohol Screen: US AUDIT-C     1  How often do you have a drink containing alcohol? 0 Filed at: 08/17/2022 1611   2   How many drinks containing alcohol do you have on a typical day you are drinking? 0 Filed at: 08/17/2022 1611   3a  Male UNDER 65: How often do you have five or more drinks on one occasion? 0 Filed at: 08/17/2022 1611   3b  FEMALE Any Age, or MALE 65+: How often do you have 4 or more drinks on one occassion? 0 Filed at: 08/17/2022 1611   Audit-C Score 0 Filed at: 08/17/2022 1611   KENNETH: How many times in the past year have you    Used an illegal drug or used a prescription medication for non-medical reasons? Never Filed at: 08/17/2022 1611                    MDM  Number of Diagnoses or Management Options  Close exposure to COVID-19 virus: new and requires workup  COVID-19 virus infection  URI (upper respiratory infection): new and requires workup  Diagnosis management comments: Pulse ox 98 - 100% in the ED  Pt is well appearing    Call to pt's daughter informing her of positive COVID 19 test result  Prescription for Paxlovid sent to pharmacy  Pt to f/u with PCP in the morning  Return precautions discussed          Amount and/or Complexity of Data Reviewed  Clinical lab tests: ordered  Tests in the radiology section of CPT®: ordered and reviewed    Risk of Complications, Morbidity, and/or Mortality  Presenting problems: high  Diagnostic procedures: high  Management options: high    Patient Progress  Patient progress: stable      Disposition  Final diagnoses:   Close exposure to COVID-19 virus   URI (upper respiratory infection)   COVID-19 virus infection     Time reflects when diagnosis was documented in both MDM as applicable and the Disposition within this note     Time User Action Codes Description Comment    8/17/2022  5:56 PM Mariah Benjamin Add [Z20 [id number]] Close exposure to COVID-19 virus     8/17/2022  5:56 PM Luis Garcia [J06 9] URI (upper respiratory infection)     8/17/2022 10:05 PM Luis Garcia [U07 1] COVID-19 virus infection       ED Disposition     ED Disposition   Discharge    Condition   Stable Date/Time   Wed Aug 17, 2022  5:56 PM    Alexys Sanford discharge to home/self care  Follow-up Information     Follow up With Specialties Details Why Contact Info    Desmond Richmond MD  Schedule an appointment as soon as possible for a visit in 1 day for follow up [phone]            Discharge Medication List as of 8/17/2022  5:57 PM      CONTINUE these medications which have NOT CHANGED    Details   acetaminophen (TYLENOL) 325 mg tablet Take 2 tablets (650 mg total) by mouth 3 (three) times a day as needed for mild pain  , Starting 1/25/2016, Until Discontinued, Normal      amLODIPine (NORVASC) 5 mg tablet Take 5 mg by mouth daily, Historical Med      Ascorbic Acid (Vitamin C) 500 MG CAPS Take 500 mg by mouth in the morning, Historical Med      benzonatate (TESSALON PERLES) 100 mg capsule Take 1 capsule (100 mg total) by mouth 3 (three) times a day as needed for cough, Starting Sun 1/16/2022, Normal      bisoprolol (ZEBETA) 10 MG tablet Take 5 mg by mouth daily  , Historical Med      chlorthalidone 25 mg tablet Take 25 mg by mouth daily, Historical Med      clopidogrel (PLAVIX) 75 mg tablet Take 75 mg by mouth daily, Historical Med      LORazepam (ATIVAN) 1 mg tablet Take 0 5 mg by mouth daily as needed for anxiety, Historical Med      pravastatin (PRAVACHOL) 20 mg tablet Take 20 mg by mouth daily, Historical Med      traMADol (ULTRAM) 50 mg tablet Take 50 mg by mouth every 6 (six) hours as needed for moderate pain, Historical Med      warfarin (COUMADIN) 2 5 mg tablet Take 5 mg by mouth daily 2 5 mg on Monday, Historical Med             No discharge procedures on file      PDMP Review     None          ED Provider  Electronically Signed by           Junaid Okeefe DO  08/17/22 9901

## 2022-10-12 PROBLEM — N30.00 ACUTE CYSTITIS WITHOUT HEMATURIA: Status: RESOLVED | Noted: 2022-01-14 | Resolved: 2022-10-12

## 2024-07-04 ENCOUNTER — APPOINTMENT (EMERGENCY)
Dept: CT IMAGING | Facility: HOSPITAL | Age: 89
DRG: 153 | End: 2024-07-04
Payer: COMMERCIAL

## 2024-07-04 ENCOUNTER — HOSPITAL ENCOUNTER (INPATIENT)
Facility: HOSPITAL | Age: 89
LOS: 1 days | Discharge: HOME | DRG: 153 | End: 2024-07-06
Attending: EMERGENCY MEDICINE | Admitting: INTERNAL MEDICINE
Payer: COMMERCIAL

## 2024-07-04 DIAGNOSIS — J06.9 URI (UPPER RESPIRATORY INFECTION): ICD-10-CM

## 2024-07-04 DIAGNOSIS — R09.02 HYPOXIA: Primary | ICD-10-CM

## 2024-07-04 DIAGNOSIS — R50.9 FEVER: ICD-10-CM

## 2024-07-04 DIAGNOSIS — I48.91 ATRIAL FIBRILLATION, UNSPECIFIED TYPE (HCC): ICD-10-CM

## 2024-07-04 LAB
2HR DELTA HS TROPONIN: 9 NG/L
4HR DELTA HS TROPONIN: 13 NG/L
ALBUMIN SERPL BCG-MCNC: 4 G/DL (ref 3.5–5)
ALP SERPL-CCNC: 91 U/L (ref 34–104)
ALT SERPL W P-5'-P-CCNC: 34 U/L (ref 7–52)
AMORPH URATE CRY URNS QL MICRO: ABNORMAL
ANION GAP SERPL CALCULATED.3IONS-SCNC: 12 MMOL/L (ref 4–13)
APTT PPP: 39 SECONDS (ref 23–37)
AST SERPL W P-5'-P-CCNC: 44 U/L (ref 13–39)
BACTERIA UR QL AUTO: ABNORMAL /HPF
BASOPHILS # BLD AUTO: 0.01 THOUSANDS/ÂΜL (ref 0–0.1)
BASOPHILS NFR BLD AUTO: 0 % (ref 0–1)
BILIRUB SERPL-MCNC: 0.86 MG/DL (ref 0.2–1)
BILIRUB UR QL STRIP: NEGATIVE
BNP SERPL-MCNC: 147 PG/ML (ref 0–100)
BUN SERPL-MCNC: 21 MG/DL (ref 5–25)
CALCIUM SERPL-MCNC: 9.5 MG/DL (ref 8.4–10.2)
CARDIAC TROPONIN I PNL SERPL HS: 19 NG/L
CARDIAC TROPONIN I PNL SERPL HS: 28 NG/L
CARDIAC TROPONIN I PNL SERPL HS: 32 NG/L
CHLORIDE SERPL-SCNC: 97 MMOL/L (ref 96–108)
CLARITY UR: ABNORMAL
CO2 SERPL-SCNC: 26 MMOL/L (ref 21–32)
COLOR UR: YELLOW
CREAT SERPL-MCNC: 1.05 MG/DL (ref 0.6–1.3)
D DIMER PPP FEU-MCNC: 1.1 UG/ML FEU
EOSINOPHIL # BLD AUTO: 0 THOUSAND/ÂΜL (ref 0–0.61)
EOSINOPHIL NFR BLD AUTO: 0 % (ref 0–6)
ERYTHROCYTE [DISTWIDTH] IN BLOOD BY AUTOMATED COUNT: 12.8 % (ref 11.6–15.1)
FLUAV RNA RESP QL NAA+PROBE: NEGATIVE
FLUBV RNA RESP QL NAA+PROBE: NEGATIVE
GFR SERPL CREATININE-BSD FRML MDRD: 46 ML/MIN/1.73SQ M
GLUCOSE SERPL-MCNC: 128 MG/DL (ref 65–140)
GLUCOSE UR STRIP-MCNC: NEGATIVE MG/DL
HCT VFR BLD AUTO: 40 % (ref 34.8–46.1)
HGB BLD-MCNC: 12.9 G/DL (ref 11.5–15.4)
HGB UR QL STRIP.AUTO: ABNORMAL
HYALINE CASTS #/AREA URNS LPF: ABNORMAL /LPF
IMM GRANULOCYTES # BLD AUTO: 0.08 THOUSAND/UL (ref 0–0.2)
IMM GRANULOCYTES NFR BLD AUTO: 1 % (ref 0–2)
INR PPP: 2.51 (ref 0.84–1.19)
KETONES UR STRIP-MCNC: NEGATIVE MG/DL
LACTATE SERPL-SCNC: 1 MMOL/L (ref 0.5–2)
LEUKOCYTE ESTERASE UR QL STRIP: NEGATIVE
LYMPHOCYTES # BLD AUTO: 0.74 THOUSANDS/ÂΜL (ref 0.6–4.47)
LYMPHOCYTES NFR BLD AUTO: 8 % (ref 14–44)
MCH RBC QN AUTO: 30.9 PG (ref 26.8–34.3)
MCHC RBC AUTO-ENTMCNC: 32.3 G/DL (ref 31.4–37.4)
MCV RBC AUTO: 96 FL (ref 82–98)
MONOCYTES # BLD AUTO: 0.97 THOUSAND/ÂΜL (ref 0.17–1.22)
MONOCYTES NFR BLD AUTO: 11 % (ref 4–12)
MUCOUS THREADS UR QL AUTO: ABNORMAL
NEUTROPHILS # BLD AUTO: 7.39 THOUSANDS/ÂΜL (ref 1.85–7.62)
NEUTS SEG NFR BLD AUTO: 80 % (ref 43–75)
NITRITE UR QL STRIP: NEGATIVE
NON-SQ EPI CELLS URNS QL MICRO: ABNORMAL /HPF
NRBC BLD AUTO-RTO: 0 /100 WBCS
PH UR STRIP.AUTO: 6 [PH]
PLATELET # BLD AUTO: 214 THOUSANDS/UL (ref 149–390)
PMV BLD AUTO: 9.7 FL (ref 8.9–12.7)
POTASSIUM SERPL-SCNC: 3.6 MMOL/L (ref 3.5–5.3)
PROCALCITONIN SERPL-MCNC: 0.21 NG/ML
PROT SERPL-MCNC: 7.5 G/DL (ref 6.4–8.4)
PROT UR STRIP-MCNC: ABNORMAL MG/DL
PROTHROMBIN TIME: 27.6 SECONDS (ref 11.6–14.5)
RBC # BLD AUTO: 4.17 MILLION/UL (ref 3.81–5.12)
RBC #/AREA URNS AUTO: ABNORMAL /HPF
RSV RNA RESP QL NAA+PROBE: NEGATIVE
SARS-COV-2 RNA RESP QL NAA+PROBE: NEGATIVE
SODIUM SERPL-SCNC: 135 MMOL/L (ref 135–147)
SP GR UR STRIP.AUTO: 1.02 (ref 1–1.03)
UROBILINOGEN UR STRIP-ACNC: <2 MG/DL
WBC # BLD AUTO: 9.19 THOUSAND/UL (ref 4.31–10.16)
WBC #/AREA URNS AUTO: ABNORMAL /HPF

## 2024-07-04 PROCEDURE — 85730 THROMBOPLASTIN TIME PARTIAL: CPT | Performed by: PHYSICIAN ASSISTANT

## 2024-07-04 PROCEDURE — 84145 PROCALCITONIN (PCT): CPT | Performed by: PHYSICIAN ASSISTANT

## 2024-07-04 PROCEDURE — 84484 ASSAY OF TROPONIN QUANT: CPT | Performed by: PHYSICIAN ASSISTANT

## 2024-07-04 PROCEDURE — 0241U HB NFCT DS VIR RESP RNA 4 TRGT: CPT | Performed by: PHYSICIAN ASSISTANT

## 2024-07-04 PROCEDURE — 85025 COMPLETE CBC W/AUTO DIFF WBC: CPT | Performed by: PHYSICIAN ASSISTANT

## 2024-07-04 PROCEDURE — 87040 BLOOD CULTURE FOR BACTERIA: CPT | Performed by: PHYSICIAN ASSISTANT

## 2024-07-04 PROCEDURE — 36415 COLL VENOUS BLD VENIPUNCTURE: CPT | Performed by: PHYSICIAN ASSISTANT

## 2024-07-04 PROCEDURE — 93005 ELECTROCARDIOGRAM TRACING: CPT

## 2024-07-04 PROCEDURE — 80053 COMPREHEN METABOLIC PANEL: CPT | Performed by: PHYSICIAN ASSISTANT

## 2024-07-04 PROCEDURE — 99223 1ST HOSP IP/OBS HIGH 75: CPT | Performed by: PHYSICIAN ASSISTANT

## 2024-07-04 PROCEDURE — 83880 ASSAY OF NATRIURETIC PEPTIDE: CPT | Performed by: PHYSICIAN ASSISTANT

## 2024-07-04 PROCEDURE — 71275 CT ANGIOGRAPHY CHEST: CPT

## 2024-07-04 PROCEDURE — 85379 FIBRIN DEGRADATION QUANT: CPT | Performed by: PHYSICIAN ASSISTANT

## 2024-07-04 PROCEDURE — 85610 PROTHROMBIN TIME: CPT | Performed by: PHYSICIAN ASSISTANT

## 2024-07-04 PROCEDURE — 87154 CUL TYP ID BLD PTHGN 6+ TRGT: CPT | Performed by: PHYSICIAN ASSISTANT

## 2024-07-04 PROCEDURE — 99285 EMERGENCY DEPT VISIT HI MDM: CPT

## 2024-07-04 PROCEDURE — 83605 ASSAY OF LACTIC ACID: CPT | Performed by: PHYSICIAN ASSISTANT

## 2024-07-04 PROCEDURE — 81001 URINALYSIS AUTO W/SCOPE: CPT | Performed by: PHYSICIAN ASSISTANT

## 2024-07-04 PROCEDURE — 99285 EMERGENCY DEPT VISIT HI MDM: CPT | Performed by: EMERGENCY MEDICINE

## 2024-07-04 RX ORDER — ONDANSETRON 2 MG/ML
4 INJECTION INTRAMUSCULAR; INTRAVENOUS EVERY 6 HOURS PRN
Status: DISCONTINUED | OUTPATIENT
Start: 2024-07-04 | End: 2024-07-06 | Stop reason: HOSPADM

## 2024-07-04 RX ORDER — METHENAMINE HIPPURATE 1000 MG/1
1 TABLET ORAL 2 TIMES DAILY WITH MEALS
COMMUNITY
Start: 2024-06-14

## 2024-07-04 RX ORDER — MAGNESIUM HYDROXIDE/ALUMINUM HYDROXICE/SIMETHICONE 120; 1200; 1200 MG/30ML; MG/30ML; MG/30ML
30 SUSPENSION ORAL EVERY 6 HOURS PRN
Status: DISCONTINUED | OUTPATIENT
Start: 2024-07-04 | End: 2024-07-06 | Stop reason: HOSPADM

## 2024-07-04 RX ORDER — CHLORTHALIDONE 25 MG/1
25 TABLET ORAL DAILY
Status: DISCONTINUED | OUTPATIENT
Start: 2024-07-05 | End: 2024-07-06 | Stop reason: HOSPADM

## 2024-07-04 RX ORDER — WARFARIN SODIUM 5 MG/1
5 TABLET ORAL
Status: COMPLETED | OUTPATIENT
Start: 2024-07-04 | End: 2024-07-04

## 2024-07-04 RX ORDER — METHENAMINE HIPPURATE 1000 MG/1
1 TABLET ORAL 2 TIMES DAILY WITH MEALS
Status: DISCONTINUED | OUTPATIENT
Start: 2024-07-05 | End: 2024-07-06 | Stop reason: HOSPADM

## 2024-07-04 RX ORDER — GABAPENTIN 100 MG/1
100 CAPSULE ORAL
COMMUNITY

## 2024-07-04 RX ORDER — PRAVASTATIN SODIUM 20 MG
20 TABLET ORAL DAILY
Status: DISCONTINUED | OUTPATIENT
Start: 2024-07-05 | End: 2024-07-06 | Stop reason: HOSPADM

## 2024-07-04 RX ORDER — BISOPROLOL FUMARATE 5 MG/1
5 TABLET, FILM COATED ORAL DAILY
Status: DISCONTINUED | OUTPATIENT
Start: 2024-07-05 | End: 2024-07-06 | Stop reason: HOSPADM

## 2024-07-04 RX ORDER — CIPROFLOXACIN 500 MG/1
500 TABLET, FILM COATED ORAL EVERY 12 HOURS SCHEDULED
Status: DISCONTINUED | OUTPATIENT
Start: 2024-07-04 | End: 2024-07-06 | Stop reason: HOSPADM

## 2024-07-04 RX ORDER — SENNOSIDES 8.6 MG
1 TABLET ORAL
Status: DISCONTINUED | OUTPATIENT
Start: 2024-07-04 | End: 2024-07-06 | Stop reason: HOSPADM

## 2024-07-04 RX ORDER — AMLODIPINE BESYLATE 5 MG/1
5 TABLET ORAL DAILY
Status: DISCONTINUED | OUTPATIENT
Start: 2024-07-05 | End: 2024-07-06 | Stop reason: HOSPADM

## 2024-07-04 RX ORDER — ASCORBIC ACID 500 MG
500 TABLET ORAL DAILY
Status: DISCONTINUED | OUTPATIENT
Start: 2024-07-05 | End: 2024-07-06 | Stop reason: HOSPADM

## 2024-07-04 RX ORDER — ACETAMINOPHEN 325 MG/1
650 TABLET ORAL ONCE
Status: COMPLETED | OUTPATIENT
Start: 2024-07-04 | End: 2024-07-04

## 2024-07-04 RX ORDER — ALBUTEROL SULFATE 90 UG/1
2 AEROSOL, METERED RESPIRATORY (INHALATION) EVERY 6 HOURS PRN
COMMUNITY

## 2024-07-04 RX ORDER — LORAZEPAM 0.5 MG/1
0.5 TABLET ORAL 2 TIMES DAILY PRN
Status: DISCONTINUED | OUTPATIENT
Start: 2024-07-04 | End: 2024-07-06 | Stop reason: HOSPADM

## 2024-07-04 RX ORDER — ACETAMINOPHEN 325 MG/1
650 TABLET ORAL EVERY 6 HOURS PRN
Status: DISCONTINUED | OUTPATIENT
Start: 2024-07-04 | End: 2024-07-06 | Stop reason: HOSPADM

## 2024-07-04 RX ORDER — GABAPENTIN 100 MG/1
100 CAPSULE ORAL
Status: DISCONTINUED | OUTPATIENT
Start: 2024-07-04 | End: 2024-07-06 | Stop reason: HOSPADM

## 2024-07-04 RX ADMIN — ACETAMINOPHEN 650 MG: 325 TABLET, FILM COATED ORAL at 15:44

## 2024-07-04 RX ADMIN — WARFARIN SODIUM 5 MG: 5 TABLET ORAL at 21:47

## 2024-07-04 RX ADMIN — IOHEXOL 85 ML: 350 INJECTION, SOLUTION INTRAVENOUS at 17:24

## 2024-07-04 RX ADMIN — CIPROFLOXACIN 500 MG: 500 TABLET ORAL at 22:31

## 2024-07-04 RX ADMIN — GABAPENTIN 100 MG: 100 CAPSULE ORAL at 21:47

## 2024-07-04 NOTE — Clinical Note
Case was discussed with RICHA Phoenix and the patient's admission status was agreed to be Admission Status: observation status to the service of Dr. Christy .

## 2024-07-04 NOTE — ED PROVIDER NOTES
History  Chief Complaint   Patient presents with    Fever     Pt to er via ems from home with reports of increased weakness and fatigue. Was seen in the er on Monday and was diagnosed with a chest infection, and was given abx. Today woke up and had a fever 102. Also reports neck and shoulder pain - doctor dx her with muscle strain from coughing     Patient is a 89-year-old female presenting to emergency department via ambulance for evaluation of hemoptysis and fatigue.  Daughter reports patient was originally having flulike symptoms last week and was seen at [facility] on Monday had a full workup including a chest x-ray which was clear but was thought to still have a chest infection and prescribed Cipro which she started on Monday.  Daughter reports patient has had left-sided neck pain for the past few days, few episodes of hemoptysis, fever of 102 °F that was found when ambulance arrived, and increased fatigue this morning that required assistance walking which prompted daughter to call ambulance.  Patient denies dyspnea, chest pain, vomiting, nausea, headache, weakness, numbness, dysuria, and abdominal pain. Patient reports she has a pace maker and is on coumadin. Patient reports that her family and herself have had flu-like symptoms last week but patient's flu-like symptoms have improved.      History provided by:  Patient (Daughter)   used: No    Fever  Associated symptoms: cough, fatigue, fever, rhinorrhea (Resolved) and sore throat (Resolved)    Associated symptoms: no abdominal pain, no chest pain, no ear pain, no nausea, no rash, no shortness of breath and no vomiting        Prior to Admission Medications   Prescriptions Last Dose Informant Patient Reported? Taking?   Ascorbic Acid (Vitamin C) 500 MG CAPS 7/3/2024  Yes Yes   Sig: Take 500 mg by mouth in the morning   LORazepam (ATIVAN) 1 mg tablet 7/3/2024  Yes Yes   Sig: Take 0.5 mg by mouth 2 (two) times a day   acetaminophen  (TYLENOL) 325 mg tablet Past Month  No Yes   Sig: Take 2 tablets (650 mg total) by mouth 3 (three) times a day as needed for mild pain.   albuterol (PROVENTIL HFA,VENTOLIN HFA) 90 mcg/act inhaler Past Month  Yes Yes   Sig: Inhale 2 puffs every 6 (six) hours as needed for wheezing   amLODIPine (NORVASC) 5 mg tablet 7/3/2024  Yes Yes   Sig: Take 5 mg by mouth daily   benzonatate (TESSALON PERLES) 100 mg capsule Past Week  No Yes   Sig: Take 1 capsule (100 mg total) by mouth 3 (three) times a day as needed for cough   bisoprolol (ZEBETA) 10 MG tablet 7/3/2024 Self Yes Yes   Sig: Take 5 mg by mouth daily     chlorthalidone 25 mg tablet 7/3/2024  Yes Yes   Sig: Take 25 mg by mouth daily   gabapentin (NEURONTIN) 100 mg capsule 7/3/2024  Yes Yes   Sig: Take 100 mg by mouth daily at bedtime   methenamine hippurate (HIPREX) 1 g tablet 7/3/2024  Yes Yes   Sig: Take 1 g by mouth 2 (two) times a day with meals   pravastatin (PRAVACHOL) 20 mg tablet 7/3/2024  Yes Yes   Sig: Take 20 mg by mouth daily   warfarin (COUMADIN) 2 mg tablet 7/3/2024  Yes Yes   Sig: Take 4 mg by mouth daily 4mg Mon, Tues, Wed, Fri, Sat Sun   3mg Thursday      Facility-Administered Medications: None       Past Medical History:   Diagnosis Date    Ambulatory dysfunction 01/25/2016    Anxiety     Arthritis     Basal cell carcinoma     BBB (bundle branch block)     Hyperlipidemia     Hypertension     Hypoglycemia     Hypoglycemia     Pneumonia     Right knee pain        Past Surgical History:   Procedure Laterality Date    BOWEL RESECTION      CARDIAC PACEMAKER PLACEMENT      CHOLECYSTECTOMY      HYSTERECTOMY      RESECTION SMALL BOWEL / CLOSURE ILEOSTOMY         Family History   Problem Relation Age of Onset    Stroke Mother      I have reviewed and agree with the history as documented.    E-Cigarette/Vaping    E-Cigarette Use Never User      E-Cigarette/Vaping Substances    Nicotine No     THC No     CBD No     Flavoring No     Other No     Unknown No       Social History     Tobacco Use    Smoking status: Former    Smokeless tobacco: Never   Vaping Use    Vaping status: Never Used   Substance Use Topics    Alcohol use: Never    Drug use: No     Comment: none       Review of Systems   Constitutional:  Positive for fatigue and fever.   HENT:  Positive for rhinorrhea (Resolved) and sore throat (Resolved). Negative for ear pain.    Eyes:  Negative for pain and visual disturbance.   Respiratory:  Positive for cough. Negative for chest tightness and shortness of breath.    Cardiovascular:  Negative for chest pain and palpitations.   Gastrointestinal:  Negative for abdominal pain, nausea and vomiting.   Genitourinary:  Negative for dysuria, flank pain, hematuria and pelvic pain.   Musculoskeletal:  Positive for neck pain.   Skin:  Negative for color change and rash.   Neurological:  Negative for seizures and syncope.   All other systems reviewed and are negative.      Physical Exam  Physical Exam  Vitals and nursing note reviewed.   Constitutional:       General: She is not in acute distress.     Appearance: She is well-developed. She is not ill-appearing.   HENT:      Head: Normocephalic and atraumatic.   Eyes:      Conjunctiva/sclera: Conjunctivae normal.   Cardiovascular:      Rate and Rhythm: Normal rate and regular rhythm.      Heart sounds: No murmur heard.  Pulmonary:      Effort: Pulmonary effort is normal. No accessory muscle usage or respiratory distress.      Breath sounds: Normal breath sounds. No decreased breath sounds, wheezing, rhonchi or rales.   Abdominal:      General: Bowel sounds are normal.      Palpations: Abdomen is soft.      Tenderness: There is no abdominal tenderness. There is no right CVA tenderness, left CVA tenderness, guarding or rebound.   Musculoskeletal:         General: No swelling.      Cervical back: Neck supple. No rigidity. Muscular tenderness (Mild tenderness to left side of neck over trapezius) present. No spinous process  tenderness.   Skin:     General: Skin is warm and dry.      Capillary Refill: Capillary refill takes less than 2 seconds.   Neurological:      Mental Status: She is alert.   Psychiatric:         Mood and Affect: Mood normal.         Behavior: Behavior is cooperative.         Vital Signs  ED Triage Vitals   Temperature Pulse Respirations Blood Pressure SpO2   07/04/24 1451 07/04/24 1451 07/04/24 1451 07/04/24 1453 07/04/24 1451   99.6 °F (37.6 °C) 74 18 (!) 178/73 92 %      Temp Source Heart Rate Source Patient Position - Orthostatic VS BP Location FiO2 (%)   07/04/24 1451 07/04/24 1451 07/04/24 1453 07/04/24 1453 --   Oral Monitor Lying Right arm       Pain Score       07/04/24 1544       Med Not Given for Pain - for MAR use only           Vitals:    07/06/24 0300 07/06/24 0436 07/06/24 0500 07/06/24 0731   BP:  109/60  108/62   Pulse: 60 60 63 61   Patient Position - Orthostatic VS:  Lying           Visual Acuity      ED Medications  Medications   acetaminophen (TYLENOL) tablet 650 mg (650 mg Oral Given 7/4/24 1544)   iohexol (OMNIPAQUE) 350 MG/ML injection (MULTI-DOSE) 85 mL (85 mL Intravenous Given 7/4/24 1724)   warfarin (COUMADIN) tablet 5 mg (5 mg Oral Given 7/4/24 2147)   potassium chloride (Klor-Con M20) CR tablet 40 mEq (40 mEq Oral Given 7/5/24 1420)   potassium chloride 20 mEq IVPB (premix) (0 mEq Intravenous Stopped 7/5/24 1945)   magnesium sulfate 2 g/50 mL IVPB (premix) 2 g (0 g Intravenous Stopped 7/5/24 1944)   warfarin (COUMADIN) tablet 3 mg (3 mg Oral Given 7/5/24 1710)       Diagnostic Studies  Results Reviewed       Procedure Component Value Units Date/Time    Blood culture #2 [[id number]] Collected: 07/04/24 1534    Lab Status: Final result Specimen: Blood from Arm, Left Updated: 07/09/24 2301     Blood Culture No Growth After 5 Days.    Blood culture #1 [[id number]]  (Abnormal) Collected: 07/04/24 1610    Lab Status: Final result Specimen: Blood from Arm, Right Updated: 07/07/24 0944     Blood  Culture Staphylococcus coagulase negative     Gram Stain Result Gram positive cocci in clusters    Narrative:      Susceptibility testing will not be performed as this organism, when isolated from a single set of blood cultures, represents probable skin era contamination. Please call the Microbiology Laboratory within 5 days at [phone] if further workup is required.      Blood Culture Identification Panel [[id number]]  (Abnormal) Collected: 07/04/24 1610    Lab Status: Final result Specimen: Blood from Arm, Right Updated: 07/07/24 0944     Staphylococcus Detected    Narrative:      Routine culture and susceptiblity to follow for confirmation.    Film Array panel tests for 11 gram positive organisms, 15 gram negative organisms, 7 yeast species and 10 resistance genes.     HS Troponin I 4hr [[id number]]  (Normal) Collected: 07/04/24 1926    Lab Status: Final result Specimen: Blood from Arm, Left Updated: 07/04/24 1952     hs TnI 4hr 32 ng/L      Delta 4hr hsTnI 13 ng/L     HS Troponin I 2hr [[id number]]  (Normal) Collected: 07/04/24 1812    Lab Status: Final result Specimen: Blood from Arm, Right Updated: 07/04/24 1840     hs TnI 2hr 28 ng/L      Delta 2hr hsTnI 9 ng/L     Protime-INR [[id number]]  (Abnormal) Collected: 07/04/24 1610    Lab Status: Final result Specimen: Blood from Arm, Right Updated: 07/04/24 1640     Protime 27.6 seconds      INR 2.51    APTT [[id number]]  (Abnormal) Collected: 07/04/24 1610    Lab Status: Final result Specimen: Blood from Arm, Right Updated: 07/04/24 1640     PTT 39 seconds     D-dimer, quantitative [[id number]]  (Abnormal) Collected: 07/04/24 1610    Lab Status: Final result Specimen: Blood from Arm, Right Updated: 07/04/24 1640     D-Dimer, Quant 1.10 ug/ml FEU     Narrative:      In the evaluation for possible pulmonary embolism, in the appropriate (Well's Score of 4 or less) patient, the age adjusted d-dimer cutoff for this patient can be calculated as:    Age x 0.01 (in  ug/mL) for Age-adjusted D-dimer exclusion threshold for a patient over 50 years.    Urine Microscopic [[id number]]  (Abnormal) Collected: 07/04/24 1539    Lab Status: Final result Specimen: Urine, Clean Catch Updated: 07/04/24 1630     RBC, UA 4-10 /hpf      WBC, UA 1-2 /hpf      Epithelial Cells Occasional /hpf      Bacteria, UA Occasional /hpf      MUCUS THREADS Occasional     Hyaline Casts, UA 0-1 /lpf      Amorphous Crystals, UA Occasional    FLU/RSV/COVID - if FLU/RSV clinically relevant [[id number]]  (Normal) Collected: 07/04/24 1534    Lab Status: Final result Specimen: Nares from Nose Updated: 07/04/24 1623     SARS-CoV-2 Negative     INFLUENZA A PCR Negative     INFLUENZA B PCR Negative     RSV PCR Negative    Narrative:      FOR PEDIATRIC PATIENTS - copy/paste COVID Guidelines URL to browser: [email or web address]    SARS-CoV-2 assay is a Nucleic Acid Amplification assay intended for the  qualitative detection of nucleic acid from SARS-CoV-2 in nasopharyngeal  swabs. Results are for the presumptive identification of SARS-CoV-2 RNA.    Positive results are indicative of infection with SARS-CoV-2, the virus  causing COVID-19, but do not rule out bacterial infection or co-infection  with other viruses. Laboratories within the [place] and its  territories are required to report all positive results to the appropriate  public health authorities. Negative results do not preclude SARS-CoV-2  infection and should not be used as the sole basis for treatment or other  patient management decisions. Negative results must be combined with  clinical observations, patient history, and epidemiological information.  This test has not been FDA cleared or approved.    This test has been authorized by FDA under an Emergency Use Authorization  (EUA). This test is only authorized for the duration of time the  declaration that circumstances exist justifying the  authorization of the  emergency use of an in vitro diagnostic tests for detection of SARS-CoV-2  virus and/or diagnosis of COVID-19 infection under section 564(b)(1) of  the Act, 21 U.S.C. 360bbb-3(b)(1), unless the authorization is terminated  or revoked sooner. The test has been validated but independent review by FDA  and CLIA is pending.    Test performed using [employer] GeneXpert: This RT-PCR assay targets N2,  a region unique to SARS-CoV-2. A conserved region in the E-gene was chosen  for pan-Sarbecovirus detection which includes SARS-CoV-2.    According to CMS-2020-01-R, this platform meets the definition of high-throughput technology.    B-Type Natriuretic Peptide(BNP) [[id number]]  (Abnormal) Collected: 07/04/24 1534    Lab Status: Final result Specimen: Blood from Arm, Left Updated: 07/04/24 1622      pg/mL     Procalcitonin [[id number]]  (Normal) Collected: 07/04/24 1534    Lab Status: Final result Specimen: Blood from Arm, Left Updated: 07/04/24 1612     Procalcitonin 0.21 ng/ml     HS Troponin 0hr (reflex protocol) [[id number]]  (Normal) Collected: 07/04/24 1534    Lab Status: Final result Specimen: Blood from Arm, Left Updated: 07/04/24 1609     hs TnI 0hr 19 ng/L     Comprehensive metabolic panel [[id number]]  (Abnormal) Collected: 07/04/24 1534    Lab Status: Final result Specimen: Blood from Arm, Left Updated: 07/04/24 1608     Sodium 135 mmol/L      Potassium 3.6 mmol/L      Chloride 97 mmol/L      CO2 26 mmol/L      ANION GAP 12 mmol/L      BUN 21 mg/dL      Creatinine 1.05 mg/dL      Glucose 128 mg/dL      Calcium 9.5 mg/dL      AST 44 U/L      ALT 34 U/L      Alkaline Phosphatase 91 U/L      Total Protein 7.5 g/dL      Albumin 4.0 g/dL      Total Bilirubin 0.86 mg/dL      eGFR 46 ml/min/1.73sq m     Narrative:      National Kidney Disease Foundation guidelines for Chronic Kidney Disease (CKD):     Stage 1 with normal or high GFR (GFR > 90 mL/min/1.73 square meters)    Stage 2 Mild CKD (GFR =  60-89 mL/min/1.73 square meters)    Stage 3A Moderate CKD (GFR = 45-59 mL/min/1.73 square meters)    Stage 3B Moderate CKD (GFR = 30-44 mL/min/1.73 square meters)    Stage 4 Severe CKD (GFR = 15-29 mL/min/1.73 square meters)    Stage 5 End Stage CKD (GFR <15 mL/min/1.73 square meters)  Note: GFR calculation is accurate only with a steady state creatinine    Lactic acid [[id number]]  (Normal) Collected: 07/04/24 1534    Lab Status: Final result Specimen: Blood from Arm, Left Updated: 07/04/24 1601     LACTIC ACID 1.0 mmol/L     Narrative:      Result may be elevated if tourniquet was used during collection.    UA w Reflex to Microscopic w Reflex to Culture [[id number]]  (Abnormal) Collected: 07/04/24 1539    Lab Status: Final result Specimen: Urine, Clean Catch Updated: 07/04/24 1548     Color, UA Yellow     Clarity, UA Hazy     Specific Gravity, UA 1.020     pH, UA 6.0     Leukocytes, UA Negative     Nitrite, UA Negative     Protein, UA 30 (1+) mg/dl      Glucose, UA Negative mg/dl      Ketones, UA Negative mg/dl      Urobilinogen, UA <2.0 mg/dl      Bilirubin, UA Negative     Occult Blood, UA Large    CBC and differential [[id number]]  (Abnormal) Collected: 07/04/24 1534    Lab Status: Final result Specimen: Blood from Arm, Left Updated: 07/04/24 1546     WBC 9.19 Thousand/uL      RBC 4.17 Million/uL      Hemoglobin 12.9 g/dL      Hematocrit 40.0 %      MCV 96 fL      MCH 30.9 pg      MCHC 32.3 g/dL      RDW 12.8 %      MPV 9.7 fL      Platelets 214 Thousands/uL      nRBC 0 /100 WBCs      Segmented % 80 %      Immature Grans % 1 %      Lymphocytes % 8 %      Monocytes % 11 %      Eosinophils Relative 0 %      Basophils Relative 0 %      Absolute Neutrophils 7.39 Thousands/µL      Absolute Immature Grans 0.08 Thousand/uL      Absolute Lymphocytes 0.74 Thousands/µL      Absolute Monocytes 0.97 Thousand/µL      Eosinophils Absolute 0.00 Thousand/µL      Basophils Absolute 0.01 Thousands/µL                    CTA ED chest  PE study   Final Result by Dee Clark MD (07/04 1822)      No central, lobar, segmental or proximal subsegmental pulmonary embolism. Evaluation of the distal subsegmental pulmonary arteries is limited.      No acute pulmonary pathology.      Additional findings as above.                  Workstation performed: [id number]                    Procedures  Procedures         ED Course  ED Course as of 07/13/24 2232   Thu Jul 04, 2024 2019 Patient had drop in O2 saturation from upper 90s to 88 when ambulating and felt more fatigued when up trying to walk. Reviewed case with ED attending, Dr. Fleming as well as with RICHA CORBNI. Plan to admit to Obs, Dr. Christy                               SBIRT 22yo+      Flowsheet Row Most Recent Value   Initial Alcohol Screen: US AUDIT-C     1. How often do you have a drink containing alcohol? 0 Filed at: 07/04/2024 1452   2. How many drinks containing alcohol do you have on a typical day you are drinking?  0 Filed at: 07/04/2024 1452   3a. Male UNDER 65: How often do you have five or more drinks on one occasion? 0 Filed at: 07/04/2024 1452   3b. FEMALE Any Age, or MALE 65+: How often do you have 4 or more drinks on one occassion? 0 Filed at: 07/04/2024 1452   Audit-C Score 0 Filed at: 07/04/2024 1452   KENNETH: How many times in the past year have you...    Used an illegal drug or used a prescription medication for non-medical reasons? Never Filed at: 07/04/2024 1452            Wells' Criteria for PE      Flowsheet Row Most Recent Value   Wells' Criteria for PE    Clinical signs and symptoms of DVT 0 Filed at: 07/04/2024 1644   PE is primary diagnosis or equally likely 0 Filed at: 07/04/2024 1644   HR >100 0 Filed at: 07/04/2024 1644   Immobilization at least 3 days or Surgery in the previous 4 weeks 0 Filed at: 07/04/2024 1644   Previous, objectively diagnosed PE or DVT 0 Filed at: 07/04/2024 1644   Hemoptysis 1 Filed at: 07/04/2024 1644   Malignancy with  treatment within 6 months or palliative 0 Filed at: 07/04/2024 1644   Wells' Criteria Total 1 Filed at: 07/04/2024 1644                  Medical Decision Making  Amount and/or Complexity of Data Reviewed  Labs: ordered.  Radiology: ordered.  ECG/medicine tests: ordered and independent interpretation performed.     Details: NSR. No significant changes from ECG taken on 1/14/22    Risk  OTC drugs.  Prescription drug management.  Decision regarding hospitalization.             Disposition  Final diagnoses:   Hypoxia   URI (upper respiratory infection)   Fever     Time reflects when diagnosis was documented in both MDM as applicable and the Disposition within this note       Time User Action Codes Description Comment    7/4/2024  8:24 PM Poli Atkins Add [R09.02] Hypoxia     7/4/2024  8:25 PM Poli Atkins Add [J06.9] URI (upper respiratory infection)     7/4/2024  8:26 PM Poli Atkins Add [R50.9] Fever     7/6/2024 11:36 AM Syed Butt Add [I48.91] Atrial fibrillation, unspecified type (HCC)           ED Disposition       ED Disposition   Admit    Condition   Stable    Date/Time   Thu Jul 4, 2024 2225    Comment   Case was discussed with RICHA Leo and the patient's admission status was agreed to be Admission Status: observation status to the service of Dr. Christy .               Follow-up Information       Follow up With Specialties Details Why Contact Info Additional Information    Juan Guerra MD Family Medicine Follow up in 1 week(s)  [address]  [place] [address]  [phone]       [facility] Follow up  [address]  [facility] [address]  [phone] [facility], [address], [address], [place], [place], [address]   [phone]            Discharge Medication List as of 7/6/2024 11:57 AM        START taking these medications    Details   ciprofloxacin  (CIPRO) 500 mg tablet Take 1 tablet (500 mg total) by mouth every 12 (twelve) hours for 5 doses, Starting Sat 7/6/2024, Until Tue 7/9/2024, Normal      predniSONE 20 mg tablet Take 1 tablet (20 mg total) by mouth daily for 3 days, Starting Sun 7/7/2024, Until Wed 7/10/2024, Normal           CONTINUE these medications which have CHANGED    Details   warfarin (COUMADIN) 2 mg tablet Take 2 tablets (4 mg total) by mouth daily 4mg Mon, Tues, Wed, Fri, Sat Sun   3mg Thursday, Starting Mon 7/1/2024, No Print           CONTINUE these medications which have NOT CHANGED    Details   acetaminophen (TYLENOL) 325 mg tablet Take 2 tablets (650 mg total) by mouth 3 (three) times a day as needed for mild pain., Starting 1/25/2016, Until Discontinued, Normal      albuterol (PROVENTIL HFA,VENTOLIN HFA) 90 mcg/act inhaler Inhale 2 puffs every 6 (six) hours as needed for wheezing, Historical Med      amLODIPine (NORVASC) 5 mg tablet Take 5 mg by mouth daily, Historical Med      Ascorbic Acid (Vitamin C) 500 MG CAPS Take 500 mg by mouth in the morning, Historical Med      benzonatate (TESSALON PERLES) 100 mg capsule Take 1 capsule (100 mg total) by mouth 3 (three) times a day as needed for cough, Starting Sun 1/16/2022, Normal      bisoprolol (ZEBETA) 10 MG tablet Take 5 mg by mouth daily  , Historical Med      chlorthalidone 25 mg tablet Take 25 mg by mouth daily, Historical Med      gabapentin (NEURONTIN) 100 mg capsule Take 100 mg by mouth daily at bedtime, Historical Med      LORazepam (ATIVAN) 1 mg tablet Take 0.5 mg by mouth 2 (two) times a day, Historical Med      methenamine hippurate (HIPREX) 1 g tablet Take 1 g by mouth 2 (two) times a day with meals, Starting Fri 6/14/2024, Historical Med      pravastatin (PRAVACHOL) 20 mg tablet Take 20 mg by mouth daily, Historical Med             Outpatient Discharge Orders   Discharge Diet     Activity as tolerated     Call provider for:  persistent dizziness or light-headedness     Call  provider for:  difficulty breathing, headache or visual disturbances       PDMP Review         Value Time User    PDMP Reviewed  Yes 7/6/2024 11:36 AM Syed Butt MD            ED Provider  Electronically Signed by             Poli Atkins PA-C  07/04/24 2221       Poli Atkins PA-C  07/13/24 2236

## 2024-07-05 LAB
ANION GAP SERPL CALCULATED.3IONS-SCNC: 8 MMOL/L (ref 4–13)
ANION GAP SERPL CALCULATED.3IONS-SCNC: 9 MMOL/L (ref 4–13)
ATRIAL RATE: 75 BPM
BUN SERPL-MCNC: 17 MG/DL (ref 5–25)
BUN SERPL-MCNC: 18 MG/DL (ref 5–25)
CALCIUM SERPL-MCNC: 9 MG/DL (ref 8.4–10.2)
CALCIUM SERPL-MCNC: 9.2 MG/DL (ref 8.4–10.2)
CHLORIDE SERPL-SCNC: 99 MMOL/L (ref 96–108)
CHLORIDE SERPL-SCNC: 99 MMOL/L (ref 96–108)
CO2 SERPL-SCNC: 28 MMOL/L (ref 21–32)
CO2 SERPL-SCNC: 30 MMOL/L (ref 21–32)
CREAT SERPL-MCNC: 0.89 MG/DL (ref 0.6–1.3)
CREAT SERPL-MCNC: 0.97 MG/DL (ref 0.6–1.3)
ERYTHROCYTE [DISTWIDTH] IN BLOOD BY AUTOMATED COUNT: 12.8 % (ref 11.6–15.1)
GFR SERPL CREATININE-BSD FRML MDRD: 51 ML/MIN/1.73SQ M
GFR SERPL CREATININE-BSD FRML MDRD: 57 ML/MIN/1.73SQ M
GLUCOSE SERPL-MCNC: 107 MG/DL (ref 65–140)
GLUCOSE SERPL-MCNC: 94 MG/DL (ref 65–140)
HCT VFR BLD AUTO: 36.7 % (ref 34.8–46.1)
HGB BLD-MCNC: 12 G/DL (ref 11.5–15.4)
INR PPP: 2.9 (ref 0.84–1.19)
MAGNESIUM SERPL-MCNC: 1.7 MG/DL (ref 1.9–2.7)
MCH RBC QN AUTO: 31.4 PG (ref 26.8–34.3)
MCHC RBC AUTO-ENTMCNC: 32.7 G/DL (ref 31.4–37.4)
MCV RBC AUTO: 96 FL (ref 82–98)
P AXIS: 55 DEGREES
PHOSPHATE SERPL-MCNC: 2.6 MG/DL (ref 2.3–4.1)
PLATELET # BLD AUTO: 207 THOUSANDS/UL (ref 149–390)
PMV BLD AUTO: 9.6 FL (ref 8.9–12.7)
POTASSIUM SERPL-SCNC: 2.5 MMOL/L (ref 3.5–5.3)
POTASSIUM SERPL-SCNC: 2.9 MMOL/L (ref 3.5–5.3)
PR INTERVAL: 182 MS
PROTHROMBIN TIME: 30.9 SECONDS (ref 11.6–14.5)
QRS AXIS: -56 DEGREES
QRSD INTERVAL: 132 MS
QT INTERVAL: 392 MS
QTC INTERVAL: 437 MS
RBC # BLD AUTO: 3.82 MILLION/UL (ref 3.81–5.12)
SODIUM SERPL-SCNC: 135 MMOL/L (ref 135–147)
SODIUM SERPL-SCNC: 138 MMOL/L (ref 135–147)
T WAVE AXIS: 48 DEGREES
VENTRICULAR RATE: 75 BPM
WBC # BLD AUTO: 8.78 THOUSAND/UL (ref 4.31–10.16)

## 2024-07-05 PROCEDURE — 80048 BASIC METABOLIC PNL TOTAL CA: CPT | Performed by: INTERNAL MEDICINE

## 2024-07-05 PROCEDURE — 94640 AIRWAY INHALATION TREATMENT: CPT

## 2024-07-05 PROCEDURE — 87081 CULTURE SCREEN ONLY: CPT | Performed by: INTERNAL MEDICINE

## 2024-07-05 PROCEDURE — 83735 ASSAY OF MAGNESIUM: CPT | Performed by: INTERNAL MEDICINE

## 2024-07-05 PROCEDURE — 99232 SBSQ HOSP IP/OBS MODERATE 35: CPT | Performed by: INTERNAL MEDICINE

## 2024-07-05 PROCEDURE — 93010 ELECTROCARDIOGRAM REPORT: CPT | Performed by: INTERNAL MEDICINE

## 2024-07-05 PROCEDURE — 85610 PROTHROMBIN TIME: CPT | Performed by: INTERNAL MEDICINE

## 2024-07-05 PROCEDURE — 94760 N-INVAS EAR/PLS OXIMETRY 1: CPT

## 2024-07-05 PROCEDURE — 85027 COMPLETE CBC AUTOMATED: CPT | Performed by: INTERNAL MEDICINE

## 2024-07-05 PROCEDURE — 84100 ASSAY OF PHOSPHORUS: CPT | Performed by: INTERNAL MEDICINE

## 2024-07-05 PROCEDURE — 99223 1ST HOSP IP/OBS HIGH 75: CPT | Performed by: INTERNAL MEDICINE

## 2024-07-05 RX ORDER — POTASSIUM CHLORIDE 14.9 MG/ML
20 INJECTION INTRAVENOUS
Status: COMPLETED | OUTPATIENT
Start: 2024-07-05 | End: 2024-07-05

## 2024-07-05 RX ORDER — MAGNESIUM SULFATE HEPTAHYDRATE 40 MG/ML
2 INJECTION, SOLUTION INTRAVENOUS ONCE
Status: COMPLETED | OUTPATIENT
Start: 2024-07-05 | End: 2024-07-05

## 2024-07-05 RX ORDER — POTASSIUM CHLORIDE 20 MEQ/1
40 TABLET, EXTENDED RELEASE ORAL EVERY 4 HOURS
Status: COMPLETED | OUTPATIENT
Start: 2024-07-05 | End: 2024-07-05

## 2024-07-05 RX ORDER — PANTOPRAZOLE SODIUM 40 MG/1
40 TABLET, DELAYED RELEASE ORAL
Status: DISCONTINUED | OUTPATIENT
Start: 2024-07-06 | End: 2024-07-06 | Stop reason: HOSPADM

## 2024-07-05 RX ORDER — LEVALBUTEROL INHALATION SOLUTION 1.25 MG/3ML
1.25 SOLUTION RESPIRATORY (INHALATION)
Status: DISCONTINUED | OUTPATIENT
Start: 2024-07-05 | End: 2024-07-06

## 2024-07-05 RX ORDER — PREDNISONE 20 MG/1
40 TABLET ORAL DAILY
Status: DISCONTINUED | OUTPATIENT
Start: 2024-07-05 | End: 2024-07-06 | Stop reason: HOSPADM

## 2024-07-05 RX ORDER — WARFARIN SODIUM 3 MG/1
3 TABLET ORAL
Status: COMPLETED | OUTPATIENT
Start: 2024-07-05 | End: 2024-07-05

## 2024-07-05 RX ADMIN — METHENAMINE HIPPURATE 1 G: 1 TABLET ORAL at 08:16

## 2024-07-05 RX ADMIN — CHLORTHALIDONE 25 MG: 25 TABLET ORAL at 08:16

## 2024-07-05 RX ADMIN — POTASSIUM CHLORIDE 20 MEQ: 14.9 INJECTION, SOLUTION INTRAVENOUS at 10:47

## 2024-07-05 RX ADMIN — PREDNISONE 40 MG: 20 TABLET ORAL at 14:20

## 2024-07-05 RX ADMIN — METHENAMINE HIPPURATE 1 G: 1 TABLET ORAL at 17:10

## 2024-07-05 RX ADMIN — CIPROFLOXACIN 500 MG: 500 TABLET ORAL at 08:16

## 2024-07-05 RX ADMIN — LEVALBUTEROL HYDROCHLORIDE 1.25 MG: 1.25 SOLUTION RESPIRATORY (INHALATION) at 19:55

## 2024-07-05 RX ADMIN — POTASSIUM CHLORIDE 40 MEQ: 1500 TABLET, EXTENDED RELEASE ORAL at 14:20

## 2024-07-05 RX ADMIN — PRAVASTATIN SODIUM 20 MG: 20 TABLET ORAL at 08:16

## 2024-07-05 RX ADMIN — MAGNESIUM SULFATE HEPTAHYDRATE 2 G: 2 INJECTION, SOLUTION INTRAVENOUS at 10:56

## 2024-07-05 RX ADMIN — BISOPROLOL FUMARATE 5 MG: 5 TABLET, FILM COATED ORAL at 08:16

## 2024-07-05 RX ADMIN — POTASSIUM CHLORIDE 40 MEQ: 1500 TABLET, EXTENDED RELEASE ORAL at 10:32

## 2024-07-05 RX ADMIN — WARFARIN SODIUM 3 MG: 3 TABLET ORAL at 17:10

## 2024-07-05 RX ADMIN — AMLODIPINE BESYLATE 5 MG: 5 TABLET ORAL at 08:16

## 2024-07-05 RX ADMIN — GABAPENTIN 100 MG: 100 CAPSULE ORAL at 21:04

## 2024-07-05 RX ADMIN — POTASSIUM CHLORIDE 20 MEQ: 14.9 INJECTION, SOLUTION INTRAVENOUS at 12:39

## 2024-07-05 RX ADMIN — OXYCODONE HYDROCHLORIDE AND ACETAMINOPHEN 500 MG: 500 TABLET ORAL at 08:16

## 2024-07-05 RX ADMIN — CIPROFLOXACIN 500 MG: 500 TABLET ORAL at 21:04

## 2024-07-05 NOTE — CONSULTS
Consultation - Pulmonary Medicine   Elena Pickering 89 y.o. female MRN: [id number]  Unit/Bed#: -01 Encounter: [id number]      Assessment & Plan:   Acute respiratory insufficiency  Viral upper respiratory infection  Atelectasis  Mild intermittent asthma without acute exacerbation  Remote minimal smoking history  A-fib on Coumadin    Patient is currently 95 to 96% on room air at rest.  Apparently was dropping between 88 and 90% with ambulation.  Consider ambulatory pulse ox prior to discharge.  No need for supplemental oxygen at this time.  Maintain pulse ox greater than 88%  Encourage use of incentive spirometer and ambulation as able  No wheezing on exam so no need for steroids or scheduled nebulizer treatments.  She may resume her as needed albuterol inhaler once discharged  Patient may follow-up with our pulmonary office should she need supplemental oxygen upon discharge      History of Present Illness   Physician Requesting Consult: Syed Butt MD  Reason for Consult / Principal Problem: Hypoxia  Hx and PE limited by: None  HPI: Elena Pickering is a 89 y.o. year old female with past medical history including A-fib, mild asthma, hyperlipidemia who initially presented yesterday with chief complaint of difficulty standing.  She and family members had been sick with a respiratory virus earlier in the week.  She had a fever in the hospital yesterday.  She denies shortness of breath.  Had a cough earlier in illness which she says has since resolved.  She has no sputum production.  At this time, she is feeling back to her baseline health she states.    She reports history of mild asthma ever since previous hospitalization years ago for pneumonia.  Also in the past when she has had respiratory infections has had transient episodes of hypoxia.  She quit smoking over 40 years ago and reports that she was a very light smoker.    She worked as a  in a bank.    Inpatient consult to  "Pulmonology  Consult performed by: Yara Wiggins PA-C  Consult ordered by: Syed Butt MD          Review of Systems   Constitutional:  Positive for fever. Negative for appetite change.   Cardiovascular:  Negative for chest pain.   Gastrointestinal:  Negative for diarrhea, nausea and vomiting.   All other systems reviewed and are negative.      Historical Information   Past Medical History:   Diagnosis Date    Ambulatory dysfunction 01/25/2016    Anxiety     Arthritis     Basal cell carcinoma     BBB (bundle branch block)     Hyperlipidemia     Hypertension     Hypoglycemia     Hypoglycemia     Pneumonia     Right knee pain      Past Surgical History:   Procedure Laterality Date    BOWEL RESECTION      CARDIAC PACEMAKER PLACEMENT      CHOLECYSTECTOMY      HYSTERECTOMY      RESECTION SMALL BOWEL / CLOSURE ILEOSTOMY       Social History   Social History     Substance and Sexual Activity   Alcohol Use Never     Social History     Substance and Sexual Activity   Drug Use No    Comment: none     E-Cigarette/Vaping    E-Cigarette Use Never User      E-Cigarette/Vaping Substances    Nicotine No     THC No     CBD No     Flavoring No     Other No     Unknown No      Social History     Tobacco Use   Smoking Status Former   Smokeless Tobacco Never         Family History:   Family History   Problem Relation Age of Onset    Stroke Mother        Meds/Allergies   all current active meds have been reviewed    Allergies   Allergen Reactions    Codeine     Penicillins     Streptomycin        Objective   Vitals: Blood pressure 132/53, pulse 65, temperature 99.5 °F (37.5 °C), temperature source Oral, resp. rate 16, height 5' 4.5\" (1.638 m), weight 87.9 kg (193 lb 12.6 oz), SpO2 91%, not currently breastfeeding.,Body mass index is 32.75 kg/m².    Intake/Output Summary (Last 24 hours) at 7/5/2024 0966  Last data filed at 7/5/2024 0915  Gross per 24 hour   Intake 890 ml   Output 50 ml   Net 840 ml     Invasive Devices       " Peripheral Intravenous Line  Duration             Peripheral IV 07/04/24 Left;Ventral (anterior) Forearm <1 day                    Physical Exam  Vitals reviewed.   Constitutional:       General: She is not in acute distress.     Appearance: She is not toxic-appearing.   HENT:      Head: Normocephalic and atraumatic.   Eyes:      General: No scleral icterus.  Cardiovascular:      Rate and Rhythm: Normal rate and regular rhythm.      Heart sounds: Murmur heard.   Pulmonary:      Effort: Pulmonary effort is normal.      Breath sounds: Normal breath sounds.   Musculoskeletal:         General: No tenderness or signs of injury.      Right lower leg: No edema.      Left lower leg: No edema.   Skin:     General: Skin is warm and dry.   Neurological:      General: No focal deficit present.      Mental Status: She is alert. Mental status is at baseline.   Psychiatric:         Mood and Affect: Mood normal.         Behavior: Behavior normal.         Lab Results: I have personally reviewed pertinent lab results.  Imaging Studies: I have personally reviewed pertinent reports.    EKG, Pathology, and Other Studies: I have personally reviewed pertinent reports.    VTE Prophylaxis: Warfarin (Coumadin)    Code Status: Level 1 - Full Code  Advance Directive and Living Will:      Power of :    POLST:

## 2024-07-05 NOTE — ASSESSMENT & PLAN NOTE
Developed flulike symptoms last week and seen at [facility] on 7/1 with negative chest x-ray, but was started on Cipro due to concern for bronchitis  Flulike symptoms have markedly improved but patient was significantly fatigued with generalized weakness this morning, so daughter called EMS  Patient still with fever, however CT chest without evidence of bacterial infection, UA without infection as well  Suspect lingering viral URI symptoms-patient reports marked improvement in cough as well  Supportive care  Pulmonary consult appreciated

## 2024-07-05 NOTE — PLAN OF CARE
Problem: PAIN - ADULT  Goal: Verbalizes/displays adequate comfort level or baseline comfort level  Description: Interventions:  - Encourage patient to monitor pain and request assistance  - Assess pain using appropriate pain scale  - Administer analgesics based on type and severity of pain and evaluate response  - Implement non-pharmacological measures as appropriate and evaluate response  - Consider cultural and social influences on pain and pain management  - Notify physician/advanced practitioner if interventions unsuccessful or patient reports new pain  Outcome: Progressing     Problem: INFECTION - ADULT  Goal: Absence or prevention of progression during hospitalization  Description: INTERVENTIONS:  - Assess and monitor for signs and symptoms of infection  - Monitor lab/diagnostic results  - Monitor all insertion sites, i.e. indwelling lines, tubes, and drains  - Monitor endotracheal if appropriate and nasal secretions for changes in amount and color  - [place] appropriate cooling/warming therapies per order  - Administer medications as ordered  - Instruct and encourage patient and family to use good hand hygiene technique  - Identify and instruct in appropriate isolation precautions for identified infection/condition  Outcome: Progressing  Goal: Absence of fever/infection during neutropenic period  Description: INTERVENTIONS:  - Monitor WBC    Outcome: Progressing     Problem: SAFETY ADULT  Goal: Patient will remain free of falls  Description: INTERVENTIONS:  - Educate patient/family on patient safety including physical limitations  - Instruct patient to call for assistance with activity   - Consult OT/PT to assist with strengthening/mobility   - Keep Call bell within reach  - Keep bed low and locked with side rails adjusted as appropriate  - Keep care items and personal belongings within reach  - Initiate and maintain comfort rounds  - Make Fall Risk Sign visible to staff  - Offer Toileting every 2 Hours,  in advance of need  - Initiate/Maintain bed alarm  - Obtain necessary fall risk management equipment:   - Apply yellow socks and bracelet for high fall risk patients  - Consider moving patient to room near nurses station  Outcome: Progressing  Goal: Maintain or return to baseline ADL function  Description: INTERVENTIONS:  -  Assess patient's ability to carry out ADLs; assess patient's baseline for ADL function and identify physical deficits which impact ability to perform ADLs (bathing, care of mouth/teeth, toileting, grooming, dressing, etc.)  - Assess/evaluate cause of self-care deficits   - Assess range of motion  - Assess patient's mobility; develop plan if impaired  - Assess patient's need for assistive devices and provide as appropriate  - Encourage maximum independence but intervene and supervise when necessary  - Involve family in performance of ADLs  - Assess for home care needs following discharge   - Consider OT consult to assist with ADL evaluation and planning for discharge  - Provide patient education as appropriate  Outcome: Progressing  Goal: Maintains/Returns to pre admission functional level  Description: INTERVENTIONS:  - Perform AM-PAC 6 Click Basic Mobility/ Daily Activity assessment daily.  - Set and communicate daily mobility goal to care team and patient/family/caregiver.   - Collaborate with rehabilitation services on mobility goals if consulted  - Perform Range of Motion 2 times a day.  - Reposition patient every 2 hours.  - Dangle patient 2 times a day  - Stand patient 2 times a day  - Ambulate patient 2 times a day  - Out of bed to chair 2 times a day   - Out of bed for meals 2 times a day  - Out of bed for toileting  - Record patient progress and toleration of activity level   Outcome: Progressing     Problem: DISCHARGE PLANNING  Goal: Discharge to home or other facility with appropriate resources  Description: INTERVENTIONS:  - Identify barriers to discharge w/patient and caregiver  -  Arrange for needed discharge resources and transportation as appropriate  - Identify discharge learning needs (meds, wound care, etc.)  - Arrange for interpretive services to assist at discharge as needed  - Refer to Case Management Department for coordinating discharge planning if the patient needs post-hospital services based on physician/advanced practitioner order or complex needs related to functional status, cognitive ability, or social support system  Outcome: Progressing     Problem: Knowledge Deficit  Goal: Patient/family/caregiver demonstrates understanding of disease process, treatment plan, medications, and discharge instructions  Description: Complete learning assessment and assess knowledge base.  Interventions:  - Provide teaching at level of understanding  - Provide teaching via preferred learning methods  Outcome: Progressing

## 2024-07-05 NOTE — PROGRESS NOTES
Formerly [facility], [facility]  Progress Note  Name: Elena Pickering I  MRN: [id number]  Unit/Bed#: -01 I Date of Admission: 7/4/2024   Date of Service: 7/5/2024  Hospital Day: 0    Assessment & Plan   Viral URI  Assessment & Plan  Developed flulike symptoms last week and seen at [facility] on 7/1 with negative chest x-ray, but was started on Cipro due to concern for bronchitis  Flulike symptoms have markedly improved but patient was significantly fatigued with generalized weakness this morning, so daughter called EMS  Patient still with fever, however CT chest without evidence of bacterial infection, UA without infection as well  Suspect lingering viral URI symptoms-patient reports marked improvement in cough as well  Supportive care  Pulmonary consult appreciated    Atrial fibrillation (HCC)  Assessment & Plan  Home regimen: Bisoprolol 5 Mg daily and AC with Coumadin 3mg on Thursday and 4 mg the remaining days   INR 2.51 on admit, has not yet taken Coumadin this evening  Continue Coumadin and monitor PT/INR in a.m.    Cardiac pacemaker in situ  Assessment & Plan  Placed in September 2021 at [facility]    Hypertension  Assessment & Plan  Home regimen: Bisoprolol 5 Mg daily, chlorthalidone 25 Mg daily, and amlodipine 5 Mg daily  Continue    * Hypoxia  Assessment & Plan  SpO2 stable at rest on room air, however with ambulation SpO2 does fluctuate between 88-90%  CT chest without any acute abnormalities, suspect atelectasis contributory and residual viral URI the patient was diagnosed with on 7/1 and has completed antibiotics  Encourage incentive spirometry  Ambulation 3 times daily  Continue on bronchodilators  Started on prednisone  Will need pulse ox with ambulation prior to discharge           Labs & Imaging: I have personally reviewed pertinent reports.      VTE Prophylaxis: in place.    Code Status:   Level 1 - Full Code    Patient Centered Rounds: I have performed bedside  "rounds with nursing staff today.    Mobility:   Basic Mobility Inpatient Raw Score: 19  JH-HLM Goal: 6: Walk 10 steps or more  JH-HLM Achieved: 6: Walk 10 steps or more  JH-HLM Goal NOT achieved. Continue with multidisciplinary rounding and encourage appropriate mobility to improve upon JH-HLM goals.    Discussions with Specialists or Other Care Team Provider: Pulmonary    Education and Discussions with Family / Patient: Daughter on phone    Total Time Spent on Date of Encounter in care of patient: 35 mins. This time was spent on one or more of the following: performing physical exam; counseling and coordination of care; obtaining or reviewing history; documenting in the medical record; reviewing/ordering tests, medications or procedures; communicating with other healthcare professionals and discussing with patient's family/caregivers.    Current Length of Stay: 0 day(s)    Current Patient Status: Inpatient   Certification Statement: The patient will continue to require additional inpatient hospital stay due to see my assessment and plan.     Subjective:   Patient is seen and examined at bedside.  Denies any new complaints.  Afebrile  All other ROS are negative.    Objective:    Vitals: Blood pressure 121/58, pulse 63, temperature 98.1 °F (36.7 °C), resp. rate 16, height 5' 4.5\" (1.638 m), weight 87.9 kg (193 lb 12.6 oz), SpO2 94%, not currently breastfeeding.,Body mass index is 32.75 kg/m².  SPO2 RA Rest      Flowsheet Row ED to Hosp-Admission (Current) from 7/4/2024 in [facility]   SpO2 94 %   SpO2 Activity At Rest   O2 Device None (Room air)   O2 Flow Rate --          I&O:   Intake/Output Summary (Last 24 hours) at 7/5/2024 1413  Last data filed at 7/5/2024 1354  Gross per 24 hour   Intake 1250 ml   Output 50 ml   Net 1200 ml       Physical Exam:    General- Alert, lying comfortably in bed. Not in any acute distress.  Neck- Supple, No JVD  CVS- regular, S1 and S2 normal  Chest- " Bilateral Air entry,   Decreased at bases  Abdomen- soft, nontender, not distended, no guarding or rigidity, BS+  Extremities-  No pedal edema, No calf tenderness                         Normal ROM in all extremities.  CNS-   Alert, awake and orientedx3. No focal deficits present.    Invasive Devices       Peripheral Intravenous Line  Duration             Peripheral IV 07/04/24 Left;Ventral (anterior) Forearm <1 day    Peripheral IV 07/05/24 Dorsal (posterior);Right Hand <1 day                          Social History  reviewed  Family History   Problem Relation Age of Onset    Stroke Mother     reviewed    Meds:  Current Facility-Administered Medications   Medication Dose Route Frequency Provider Last Rate Last Admin    acetaminophen (TYLENOL) tablet 650 mg  650 mg Oral Q6H PRN RICHA Dorado-C        aluminum-magnesium hydroxide-simethicone (MAALOX) oral suspension 30 mL  30 mL Oral Q6H PRN Tatiana Phoenix PA-C        amLODIPine (NORVASC) tablet 5 mg  5 mg Oral Daily RICHA Dorado-C   5 mg at 07/05/24 0816    ascorbic acid (VITAMIN C) tablet 500 mg  500 mg Oral Daily RICHA Dorado-C   500 mg at 07/05/24 0816    bisoprolol (ZEBETA) tablet 5 mg  5 mg Oral Daily RICHA Dorado-C   5 mg at 07/05/24 0816    chlorthalidone tablet 25 mg  25 mg Oral Daily Tatiana Phoenix PA-C   25 mg at 07/05/24 0816    ciprofloxacin (CIPRO) tablet 500 mg  500 mg Oral Q12H VIRI RICHA Dorado-C   500 mg at 07/05/24 0816    gabapentin (NEURONTIN) capsule 100 mg  100 mg Oral HS RICHA Dorado-C   100 mg at 07/04/24 2147    levalbuterol (XOPENEX) inhalation solution 1.25 mg  1.25 mg Nebulization TID Syed Butt MD        LORazepam (ATIVAN) tablet 0.5 mg  0.5 mg Oral BID PRN Tatiana Phoenix PA-C        methenamine hippurate (HIPREX) tablet 1 g  1 g Oral BID With Meals GHADA DoradoC   1 g at 07/05/24 0816    ondansetron (ZOFRAN) injection 4 mg  4 mg Intravenous Q6H PRN Tatiana HERNANDES  "ACACIA Phoenix        potassium chloride (Klor-Con M20) CR tablet 40 mEq  40 mEq Oral Q4H Syed Butt MD   40 mEq at 07/05/24 1032    potassium chloride 20 mEq IVPB (premix)  20 mEq Intravenous Q2H Syed Butt MD 50 mL/hr at 07/05/24 1239 20 mEq at 07/05/24 1239    pravastatin (PRAVACHOL) tablet 20 mg  20 mg Oral Daily Tatiana Phoenix PA-C   20 mg at 07/05/24 0816    predniSONE tablet 40 mg  40 mg Oral Daily Syed Butt MD        senna (SENOKOT) tablet 8.6 mg  1 tablet Oral HS PRN Tatiana Phoenix PA-C        warfarin (COUMADIN) tablet 3 mg  3 mg Oral Once (warfarin) Syed Butt MD            Medications Prior to Admission:     acetaminophen (TYLENOL) 325 mg tablet    albuterol (PROVENTIL HFA,VENTOLIN HFA) 90 mcg/act inhaler    amLODIPine (NORVASC) 5 mg tablet    Ascorbic Acid (Vitamin C) 500 MG CAPS    benzonatate (TESSALON PERLES) 100 mg capsule    bisoprolol (ZEBETA) 10 MG tablet    chlorthalidone 25 mg tablet    gabapentin (NEURONTIN) 100 mg capsule    LORazepam (ATIVAN) 1 mg tablet    methenamine hippurate (HIPREX) 1 g tablet    pravastatin (PRAVACHOL) 20 mg tablet    warfarin (COUMADIN) 2 mg tablet    Labs:  Results from last 7 days   Lab Units 07/05/24  0307 07/04/24  1534   WBC Thousand/uL 8.78 9.19   HEMOGLOBIN g/dL 12.0 12.9   HEMATOCRIT % 36.7 40.0   PLATELETS Thousands/uL 207 214   SEGS PCT %  --  80*   LYMPHO PCT %  --  8*   MONO PCT %  --  11   EOS PCT %  --  0     Results from last 7 days   Lab Units 07/05/24  0736 07/05/24  0307 07/04/24  1534   POTASSIUM mmol/L 2.9* 2.5* 3.6   CHLORIDE mmol/L 99 99 97   CO2 mmol/L 30 28 26   BUN mg/dL 17 18 21   CREATININE mg/dL 0.97 0.89 1.05   CALCIUM mg/dL 9.2 9.0 9.5   ALK PHOS U/L  --   --  91   ALT U/L  --   --  34   AST U/L  --   --  44*     No results found for: \"TROPONINI\", \"CKMB\", \"CKTOTAL\"  Results from last 7 days   Lab Units 07/05/24  0311 07/04/24  1610   INR  2.90* 2.51*     Lab Results   Component Value Date    BLOODCX " Received in Microbiology Lab. Culture in Progress. 07/04/2024    BLOODCX Received in Microbiology Lab. Culture in Progress. 07/04/2024    BLOODCX No Growth After 5 Days. 01/14/2022    BLOODCX No Growth After 5 Days. 01/14/2022    URINECX <10,000 cfu/ml 01/14/2022    URINECX 30,000-39,000 cfu/ml Mixed Contaminants X4 01/24/2016         Imaging:  Results for orders placed during the hospital encounter of 08/17/22    XR chest 1 view portable    Narrative  CHEST    INDICATION:   cough/covid exposure.    COMPARISON:  One view chest 1/14/2022    EXAM PERFORMED/VIEWS:  XR CHEST PORTABLE      FINDINGS:  Left-sided chest wall intracardiac device is identified.  Leads are intact.    Normal cardiac silhouette.  Aortic calcification is present.    Stable eventration right hemidiaphragm.    No airspace consolidation, pneumothorax, pulmonary edema, or pleural effusion.    Degenerative changes bilateral shoulders with additional findings compatible with chronic rotator cuff tears.    Impression  No radiographic evidence of acute intrathoracic process or significant interval change.            Workstation performed: [id number]    No results found for this or any previous visit.      Last 24 Hours Medication List:   Current Facility-Administered Medications   Medication Dose Route Frequency Provider Last Rate    acetaminophen  650 mg Oral Q6H PRN Tatianaej Phoenix PA-C      aluminum-magnesium hydroxide-simethicone  30 mL Oral Q6H PRN Tatianaej Phoenix, PA-C      amLODIPine  5 mg Oral Daily Tatiana Phoenix, PA-C      ascorbic acid  500 mg Oral Daily Tatianaej Phoenix, PA-C      bisoprolol  5 mg Oral Daily Tatianaej Phoenix, PA-C      chlorthalidone  25 mg Oral Daily Tatiana GRETA Phoenix, PA-C      ciprofloxacin  500 mg Oral Q12H VIRI Tatianaej Phoenix PA-C      gabapentin  100 mg Oral HS Tatiana Phoenix PA-C      levalbuterol  1.25 mg Nebulization TID Syed Butt MD      LORazepam  0.5 mg Oral BID PRN Tatianaej Phoenix, ACACIA       methenamine hippurate  1 g Oral BID With Meals Tatiana Phoenix PA-C      ondansetron  4 mg Intravenous Q6H PRN Tatiana Phoenix PA-C      potassium chloride  40 mEq Oral Q4H Syed Butt MD      potassium chloride  20 mEq Intravenous Q2H Syed Butt MD 20 mEq (07/05/24 1239)    pravastatin  20 mg Oral Daily Tatiana Phoenix PA-C      predniSONE  40 mg Oral Daily Syed Butt MD      senna  1 tablet Oral HS PRN Tatiana Phoenix PA-C      warfarin  3 mg Oral Once (warfarin) Syed Butt MD          Today, Patient Was Seen By: Syed Butt MD    ** Please Note: Dictation voice to text software may have been used in the creation of this document. **

## 2024-07-05 NOTE — ASSESSMENT & PLAN NOTE
Home regimen: Bisoprolol 5 Mg daily and AC with Coumadin 3mg on Thursday and 4 mg the remaining days   INR 2.51 on admit, has not yet taken Coumadin this evening  Continue Coumadin and monitor PT/INR in a.m.

## 2024-07-05 NOTE — UTILIZATION REVIEW
Initial Clinical Review    Admission: Date/Time/Statement: 7/4/24 2024 observation and CHANGED 7/5/24 1409 INPATIENT RE: PATIENT NEEDS > 2 MIDNIGHT STAY DUE TO RESPIRATORY COMPROMISE WITH DECREASED SAT WITH AMBULATION IN SETTING OF VIRAL URI, TO CONTINUE OXIMETRY AND PULMONARY CONSULTED.   Admission Orders (From admission, onward)       Ordered        07/05/24 1409  INPATIENT ADMISSION  Once                          Orders Placed This Encounter   Procedures    INPATIENT ADMISSION     Standing Status:   Standing     Number of Occurrences:   1     Order Specific Question:   Level of Care     Answer:   Med Surg [16]     Order Specific Question:   Estimated length of stay     Answer:   More than 2 Midnights     Order Specific Question:   Certification     Answer:   I certify that inpatient services are medically necessary for this patient for a duration of greater than two midnights. See H&P and MD Progress Notes for additional information about the patient's course of treatment.     ED Arrival Information       Expected   -    Arrival   7/4/2024 14:49    Acuity   Emergent              Means of arrival   Ambulance    Escorted by   [employer] ([place])    Service   Hospitalist    Admission type   Emergency              Arrival complaint   -             Chief Complaint   Patient presents with    Fever     Pt to er via ems from home with reports of increased weakness and fatigue. Was seen in the er on Monday and was diagnosed with a chest infection, and was given abx. Today woke up and had a fever 102. Also reports neck and shoulder pain - doctor dx her with muscle strain from coughing       Initial Presentation: 89 y.o. female  to ED via EMS from home.    Admitted to observation with Dx: Hypoxia/Viral URI.  Presented to ED with increased fatigue on morning of arrival so much so needed assistance to walk.  Last few days with left sided neck pain, intermittent hemoptysis and febrile.   Last week with flu like symptoms, seen  at outside hospital 7/1 and prescribed Cipro  Per ems temp to 102. . PMHx: f A-fib on Coumadin and HTN . On exam: mild tenderness to left side of neck over trapezius.   D Dimer 1.10.  .  Imaging shows no acute pathology ct chest.   ED treatment tylenol.  With ambulation oxygen sat form upper 90s to 88%.     Plan includes oximetry, incentive spirometry, ambulation. Supportive care for URI.  Continue home Cipro    Anticipated Length of Stay/Certification Statement: Patient will be admitted on an observation basis with an anticipated length of stay of less than 2 midnights secondary to hypoxia with ambulation.     Current Patient Status: Inpatient   Certification Statement: The patient will continue to require additional inpatient hospital stay due to see my assessment and plan  Date: 7/5/24   Day 2 CHANGED TO INPATIENT:   Still with fever. cough improved.  On exam: cardiac murmur. K 2.9.   Mg 1.7.  plan is supportive care, monitor respiratory status.  Incentive spirometry. Oximetry.   Continue bronchodilators, started on prednisone and scheduled nebs.  Replete electrolytes and trend.     7/5/24  per Pulmonary:   Acute respiratory insufficiency/Viral upper respiratory infection/ Atelectasis/Mild intermittent asthma without acute exacerbation/Remote minimal smoking history.  Recommend pulse ox prior to dc.   Maintain pulse ox > 88%.  No need steroids.     ED Triage Vitals   Temperature Pulse Respirations Blood Pressure SpO2 Pain Score   07/04/24 1451 07/04/24 1451 07/04/24 1451 07/04/24 1453 07/04/24 1451 07/04/24 1544   99.6 °F (37.6 °C) 74 18 (!) 178/73 92 % Med Not Given for Pain - for MAR use only     Weight (last 2 days)       Date/Time Weight    07/04/24 2100 87.9 (193.78)    07/04/24 1450 89.9 (198.19)          Vital Signs (last 3 days)       Date/Time Temp Pulse Resp BP MAP (mmHg) SpO2 O2 Device Patient Position - Orthostatic VS Angeliac Coma Scale Score Pain    07/05/24 14:13:10 98.1 °F (36.7 °C) 63 17  121/58 79 94 % None (Room air) Sitting -- --    07/05/24 0830 -- -- -- -- -- -- None (Room air) -- 15 No Pain    07/05/24 07:12:41 99.5 °F (37.5 °C) 65 16 132/53 79 91 % None (Room air) Lying -- --    07/04/24 21:03:44 99.2 °F (37.3 °C) 63 18 160/59 93 95 % None (Room air) Lying 15 --    07/04/24 2100 -- -- -- -- -- 100 % None (Room air) -- -- No Pain    07/04/24 1952 -- 60 16 152/70 104 96 % None (Room air) Lying -- --    07/04/24 1930 -- 60 26 139/63 90 96 % -- -- -- --    07/04/24 1900 -- 60 26 138/66 95 91 % -- -- -- --    07/04/24 1830 -- 60 22 127/60 87 91 % None (Room air) Sitting -- --    07/04/24 1820 -- 60 29 121/60 87 91 % -- -- -- --    07/04/24 1800 -- 60 20 141/63 90 90 % -- -- -- --    07/04/24 1730 -- 60 24 132/58 84 89 % -- -- -- --    07/04/24 1700 99.6 °F (37.6 °C) 62 18 131/59 85 94 % None (Room air) Sitting -- --    07/04/24 1630 -- 64 19 135/59 85 94 % None (Room air) Sitting -- --    07/04/24 1600 -- 67 29 146/66 95 96 % -- -- -- --    07/04/24 1550 -- 70 32 188/76 109 97 % -- -- -- --    07/04/24 1544 -- -- -- -- -- -- -- -- -- Med Not Given for Pain - for MAR use only    07/04/24 1516 -- -- -- -- -- -- -- -- 15 --    07/04/24 1500 -- 74 18 178/73 105 91 % -- -- -- --    07/04/24 1456 102.1 °F (38.9 °C) -- -- -- -- -- -- -- -- --    07/04/24 1453 -- -- -- 178/73 -- -- -- Lying -- --    07/04/24 1451 99.6 °F (37.6 °C) 74 18 -- -- 92 % None (Room air) -- -- --              Pertinent Labs/Diagnostic Test Results:   Radiology:  CTA ED chest PE study   Final Interpretation by Dee Clark MD (07/04 1822)      No central, lobar, segmental or proximal subsegmental pulmonary embolism. Evaluation of the distal subsegmental pulmonary arteries is limited.      No acute pulmonary pathology.      Additional findings as above.                  Workstation performed: [id number]           Results from last 7 days   Lab Units 07/04/24  1534   SARS-COV-2  Negative     Results from last 7 days   Lab Units  07/05/24  0307 07/04/24  1534   WBC Thousand/uL 8.78 9.19   HEMOGLOBIN g/dL 12.0 12.9   HEMATOCRIT % 36.7 40.0   PLATELETS Thousands/uL 207 214   TOTAL NEUT ABS Thousands/µL  --  7.39     Results from last 7 days   Lab Units 07/05/24  0736 07/05/24  0307 07/04/24  1534   SODIUM mmol/L 138 135 135   POTASSIUM mmol/L 2.9* 2.5* 3.6   CHLORIDE mmol/L 99 99 97   CO2 mmol/L 30 28 26   ANION GAP mmol/L 9 8 12   BUN mg/dL 17 18 21   CREATININE mg/dL 0.97 0.89 1.05   EGFR ml/min/1.73sq m 51 57 46   CALCIUM mg/dL 9.2 9.0 9.5   MAGNESIUM mg/dL  --  1.7*  --    PHOSPHORUS mg/dL  --  2.6  --      Results from last 7 days   Lab Units 07/04/24  1534   AST U/L 44*   ALT U/L 34   ALK PHOS U/L 91   TOTAL PROTEIN g/dL 7.5   ALBUMIN g/dL 4.0   TOTAL BILIRUBIN mg/dL 0.86     Results from last 7 days   Lab Units 07/05/24  0736 07/05/24  0307 07/04/24  1534   GLUCOSE RANDOM mg/dL 107 94 128     Results from last 7 days   Lab Units 07/04/24  1926 07/04/24  1812 07/04/24  1534   HS TNI 0HR ng/L  --   --  19   HS TNI 2HR ng/L  --  28  --    HSTNI D2 ng/L  --  9  --    HS TNI 4HR ng/L 32  --   --    HSTNI D4 ng/L 13  --   --      Results from last 7 days   Lab Units 07/04/24  1610   D-DIMER QUANTITATIVE ug/ml FEU 1.10*     Results from last 7 days   Lab Units 07/05/24  0311 07/04/24  1610   PROTIME seconds 30.9* 27.6*   INR  2.90* 2.51*   PTT seconds  --  39*     Results from last 7 days   Lab Units 07/04/24  1534   PROCALCITONIN ng/ml 0.21     Results from last 7 days   Lab Units 07/04/24  1534   LACTIC ACID mmol/L 1.0     Results from last 7 days   Lab Units 07/04/24  1534   BNP pg/mL 147*     Results from last 7 days   Lab Units 07/04/24  1539   CLARITY UA  Hazy   COLOR UA  Yellow   SPEC GRAV UA  1.020   PH UA  6.0   GLUCOSE UA mg/dl Negative   KETONES UA mg/dl Negative   BLOOD UA  Large*   PROTEIN UA mg/dl 30 (1+)*   NITRITE UA  Negative   BILIRUBIN UA  Negative   UROBILINOGEN UA (BE) mg/dl <2.0   LEUKOCYTES UA  Negative   WBC UA /hpf 1-2    RBC UA /hpf 4-10*   BACTERIA UA /hpf Occasional   EPITHELIAL CELLS WET PREP /hpf Occasional   MUCUS THREADS  Occasional*     Results from last 7 days   Lab Units 07/04/24  1534   INFLUENZA A PCR  Negative   INFLUENZA B PCR  Negative   RSV PCR  Negative     Results from last 7 days   Lab Units 07/04/24  1610 07/04/24  1534   BLOOD CULTURE  Received in Microbiology Lab. Culture in Progress. Received in Microbiology Lab. Culture in Progress.     ED Treatment-Medication Administration from 07/04/2024 1448 to 07/04/2024 2058         Date/Time Order Dose Route Action     07/04/2024 1544 acetaminophen (TYLENOL) tablet 650 mg 650 mg Oral Given            Past Medical History:   Diagnosis Date    Ambulatory dysfunction 01/25/2016    Anxiety     Arthritis     Basal cell carcinoma     BBB (bundle branch block)     Hyperlipidemia     Hypertension     Hypoglycemia     Hypoglycemia     Pneumonia     Right knee pain      Present on Admission:   Hypertension   Cardiac pacemaker in situ   Atrial fibrillation (HCC)      Admitting Diagnosis: Altered mental status [R41.82]  URI (upper respiratory infection) [J06.9]  Hypoxia [R09.02]  Fever [R50.9]  Age/Sex: 89 y.o. female  Admission Orders:  Scheduled Medications:  amLODIPine, 5 mg, Oral, Daily  ascorbic acid, 500 mg, Oral, Daily  bisoprolol, 5 mg, Oral, Daily  chlorthalidone, 25 mg, Oral, Daily  ciprofloxacin, 500 mg, Oral, Q12H VIRI  gabapentin, 100 mg, Oral, HS  methenamine hippurate, 1 g, Oral, BID With Meals  pravastatin, 20 mg, Oral, Daily    levalbuterol (XOPENEX) inhalation solution 1.25 mg  Dose: 1.25 mg  Freq: 3 times daily (RESP) Route: NEBULIZATION  Start: 07/05/24 1415     magnesium sulfate 2 g/50 mL IVPB (premix) 2 g  Dose: 2 g  Freq: Once Route: IV  Last Dose: 2 g (07/05/24 1056)  Start: 07/05/24 1000 End: 07/05/24 1256   potassium chloride (Klor-Con M20) CR tablet 40 mEq  Dose: 40 mEq  Freq: Every 4 hours Route: PO  Start: 07/05/24 1000 End: 07/05/24 1420  potassium  chloride 20 mEq IVPB (premix)  Dose: 20 mEq  Freq: Every 2 hours Route: IV  Last Dose: 20 mEq (07/05/24 1239)  Start: 07/05/24 1000 End: 07/05/24 1439  predniSONE tablet 40 mg  Dose: 40 mg  Freq: Daily Route: PO  Start: 07/05/24 1415    Continuous IV Infusions:     PRN Meds: not used.   acetaminophen, 650 mg, Oral, Q6H PRN  aluminum-magnesium hydroxide-simethicone, 30 mL, Oral, Q6H PRN  LORazepam, 0.5 mg, Oral, BID PRN  ondansetron, 4 mg, Intravenous, Q6H PRN  senna, 1 tablet, Oral, HS PRN    Incentive spirometry    IP CONSULT TO PULMONOLOGY    Network Utilization Review Department  ATTENTION: Please call with any questions or concerns to [phone] and carefully listen to the prompts so that you are directed to the right person. All voicemails are confidential.   For Discharge needs, contact Care Management DC Support Team at [phone] opt. [phone]  Send all requests for admission clinical reviews, approved or denied determinations and any other requests to dedicated fax number below belonging to the campus where the patient is receiving treatment. List of dedicated fax numbers for the Facilities:  FACILITY NAME UR FAX NUMBER   ADMISSION DENIALS (Administrative/Medical Necessity) [phone]   DISCHARGE SUPPORT TEAM (NETWORK) [phone]   PARENT CHILD HEALTH (Maternity/NICU/Pediatrics) [phone]   [facility] [phone]   [facility] [phone]   [facility] [phone]   [facility] [phone]   [facility] [phone]   [facility] [phone]   [facility] [phone]   [facility] [phone]   [facility] [phone]   [facility] [phone]   [facility] [phone]   [facility]  [facility] [phone]

## 2024-07-05 NOTE — ASSESSMENT & PLAN NOTE
Developed flulike symptoms last week and seen at [facility] on 7/1 with negative chest x-ray, but was started on Cipro due to concern for bronchitis  Flulike symptoms have markedly improved but patient was significantly fatigued with generalized weakness this morning, so daughter called EMS  Patient still with fever, however CT chest without evidence of bacterial infection, UA without infection as well  Suspect lingering viral URI symptoms-patient reports marked improvement in cough as well  Supportive care

## 2024-07-05 NOTE — NURSING NOTE
Patient completed an ambulatory oxygen saturation exam with RN. Patient ambulated approximately 50 feet on no supplemental oxygen. Method of assistance used was a standby with walker. Patient seemed steady throughout ambulating and did not complain of any weakness. Patient's oxygen saturation did drop during ambulation to 81% and held at that until breaks during the walk were taken. Patient recovered quickly to the low 90's. Patient did not complain of shortness of breath, dizziness, or lightheadedness.

## 2024-07-05 NOTE — ASSESSMENT & PLAN NOTE
Home regimen: Bisoprolol 5 Mg daily and AC with Coumadin 5 Mg daily except Monday is 3 Mg  INR 2.51 on admit, has not yet taken Coumadin this evening  Coumadin 5 Mg ordered for tonight, INR for morning

## 2024-07-05 NOTE — ASSESSMENT & PLAN NOTE
SpO2 stable at rest on room air, however with ambulation SpO2 does fluctuate between 88-90%  CT chest without any acute abnormalities, suspect atelectasis contributory and residual viral URI the patient was diagnosed with on 7/1 and has completed antibiotics  Encourage incentive spirometry  Ambulation 3 times daily  Continue on bronchodilators  Started on prednisone  Will need pulse ox with ambulation prior to discharge

## 2024-07-05 NOTE — ASSESSMENT & PLAN NOTE
SpO2 stable at rest on room air, however with ambulation SpO2 does fluctuate between 88-90%  CT chest without any acute abnormalities, suspect atelectasis contributory and residual viral URI the patient was diagnosed with on 7/1 and has completed antibiotics  Encourage incentive spirometry  Ambulation 3 times daily  Hopeful to discharge tomorrow

## 2024-07-05 NOTE — H&P
[facility]  H&P  Name: Elena Pickering 89 y.o. female I MRN: [id number]  Unit/Bed#: -01 I Date of Admission: 7/4/2024   Date of Service: 7/4/2024  Hospital Day: 0      Assessment & Plan   * Hypoxia  Assessment & Plan  SpO2 stable at rest on room air, however with ambulation SpO2 does fluctuate between 88-90%  CT chest without any acute abnormalities, suspect atelectasis contributory and residual viral URI the patient was diagnosed with on 7/1 and has completed antibiotics  Encourage incentive spirometry  Ambulation 3 times daily  Hopeful to discharge tomorrow    Viral URI  Assessment & Plan  Developed flulike symptoms last week and seen at [facility] on 7/1 with negative chest x-ray, but was started on Cipro due to concern for bronchitis  Flulike symptoms have markedly improved but patient was significantly fatigued with generalized weakness this morning, so daughter called EMS  Patient still with fever, however CT chest without evidence of bacterial infection, UA without infection as well  Suspect lingering viral URI symptoms-patient reports marked improvement in cough as well  Supportive care    Hypertension  Assessment & Plan  Home regimen: Bisoprolol 5 Mg daily, chlorthalidone 25 Mg daily, and amlodipine 5 Mg daily  Continue    Cardiac pacemaker in situ  Assessment & Plan  Placed in September 2021 at [facility]    Atrial fibrillation (HCC)  Assessment & Plan  Home regimen: Bisoprolol 5 Mg daily and AC with Coumadin 3mg on Thursday and 4 mg the remaining days   INR 2.51 on admit, has not yet taken Coumadin this evening  Coumadin 5 Mg ordered for 7/4 evening (ordered prior to daughter notifying of different dosing), INR for morning           VTE Pharmacologic Prophylaxis: VTE Score: 3 Moderate Risk (Score 3-4) - Pharmacological DVT Prophylaxis Ordered: warfarin (Coumadin).  Code Status: Level 1 - Full Code   Discussion with family:  Attempted to call  "daughter, no answer on mobile.  Voicemail left with callback number.     Anticipated Length of Stay: Patient will be admitted on an observation basis with an anticipated length of stay of less than 2 midnights secondary to hypoxia with ambulation.    Chief Complaint: \" I was coughing and coughing\"    History of Present Illness:  Elena Pickering is a 89 y.o. female with a PMH of A-fib on Coumadin and HTN who presents with mother for evaluation of fatigue and fever.  Patient reports that she developed flulike symptoms last week, along with the rest of her family.  She was taken to the ER on Monday and then started on Cipro due to concern for likely bronchitis.  Chest x-ray was negative at that time.  Patient reports her cough and flulike symptoms have significantly improved.  She did have a couple of episodes of hemoptysis, with last on Monday.  Today, patient was significantly fatigued with ongoing fever.  She also did have an episode of left-sided neck and arm pain yesterday.  PCP felt that it was a muscle strain due to her excessive coughing.  Area was tender to palpation in the ED.  Patient denies any chest pain or dyspnea.  No abdominal pain, nausea, vomiting, or change in bowel or bladder habits.  She was at home with her daughter and ambulates with a cane at baseline.    Review of Systems:  Review of Systems   Constitutional:  Positive for fatigue and fever. Negative for chills.   Respiratory:  Positive for cough. Negative for shortness of breath.    Cardiovascular:  Negative for chest pain and leg swelling.   Gastrointestinal:  Negative for abdominal pain, constipation, diarrhea, nausea and vomiting.   Genitourinary:  Negative for difficulty urinating and dysuria.   Musculoskeletal:  Positive for gait problem (Cane at baseline).   Neurological:  Positive for weakness (Generalized, nonfocal). Negative for numbness.   All other systems reviewed and are negative.      Past Medical and Surgical History:   Past " Medical History:   Diagnosis Date    Ambulatory dysfunction 01/25/2016    Anxiety     Arthritis     Basal cell carcinoma     BBB (bundle branch block)     Hyperlipidemia     Hypertension     Hypoglycemia     Hypoglycemia     Pneumonia     Right knee pain        Past Surgical History:   Procedure Laterality Date    BOWEL RESECTION      CARDIAC PACEMAKER PLACEMENT      CHOLECYSTECTOMY      HYSTERECTOMY      RESECTION SMALL BOWEL / CLOSURE ILEOSTOMY         Meds/Allergies:  Prior to Admission medications    Medication Sig Start Date End Date Taking? Authorizing Provider   acetaminophen (TYLENOL) 325 mg tablet Take 2 tablets (650 mg total) by mouth 3 (three) times a day as needed for mild pain. 1/25/16  Yes Jett Liriano MD   albuterol (PROVENTIL HFA,VENTOLIN HFA) 90 mcg/act inhaler Inhale 2 puffs every 6 (six) hours as needed for wheezing   Yes Historical Provider, MD   amLODIPine (NORVASC) 5 mg tablet Take 5 mg by mouth daily   Yes Historical Provider, MD   Ascorbic Acid (Vitamin C) 500 MG CAPS Take 500 mg by mouth in the morning   Yes Historical Provider, MD   benzonatate (TESSALON PERLES) 100 mg capsule Take 1 capsule (100 mg total) by mouth 3 (three) times a day as needed for cough 1/16/22  Yes Jelly Simmons MD   bisoprolol (ZEBETA) 10 MG tablet Take 5 mg by mouth daily     Yes Historical Provider, MD   chlorthalidone 25 mg tablet Take 25 mg by mouth daily   Yes Historical Provider, MD   gabapentin (NEURONTIN) 100 mg capsule Take 100 mg by mouth daily at bedtime   Yes Historical Provider, MD   LORazepam (ATIVAN) 1 mg tablet Take 0.5 mg by mouth 2 (two) times a day   Yes Historical Provider, MD   methenamine hippurate (HIPREX) 1 g tablet Take 1 g by mouth 2 (two) times a day with meals 6/14/24  Yes Historical Provider, MD   pravastatin (PRAVACHOL) 20 mg tablet Take 20 mg by mouth daily   Yes Historical Provider, MD   warfarin (COUMADIN) 2.5 mg tablet Take 5 mg by mouth daily 4mg Mon, Tues, Wed, Fri, Sat Sun   3mg  "Thursday   Yes Historical Provider, MD   clopidogrel (PLAVIX) 75 mg tablet Take 75 mg by mouth daily  Patient not taking: Reported on 7/4/2024    Historical Provider, MD   traMADol (ULTRAM) 50 mg tablet Take 50 mg by mouth every 6 (six) hours as needed for moderate pain  Patient not taking: Reported on 7/4/2024    Historical Provider, MD     I have reviewed home medications with patient personally.    Allergies:   Allergies   Allergen Reactions    Codeine     Penicillins     Streptomycin        Social History:  Marital Status:    Occupation: retired  Patient Pre-hospital Living Situation: Home, With other family member: daughter  Patient Pre-hospital Level of Mobility: walks with cane  Patient Pre-hospital Diet Restrictions: none  Substance Use History:   Social History     Substance and Sexual Activity   Alcohol Use Never     Social History     Tobacco Use   Smoking Status Former   Smokeless Tobacco Never     Social History     Substance and Sexual Activity   Drug Use No    Comment: none       Family History:  Family History   Problem Relation Age of Onset    Stroke Mother        Physical Exam:     Vitals:   Blood Pressure: 160/59 (07/04/24 2103)  Pulse: 63 (07/04/24 2103)  Temperature: 99.2 °F (37.3 °C) (07/04/24 2103)  Temp Source: Axillary (07/04/24 2103)  Respirations: 18 (07/04/24 2103)  Height: 5' 4.5\" (163.8 cm) (07/04/24 2100)  Weight - Scale: 87.9 kg (193 lb 12.6 oz) (07/04/24 2100)  SpO2: 95 % (07/04/24 2103)    Physical Exam  Vitals and nursing note reviewed.   Constitutional:       General: She is not in acute distress.     Appearance: Normal appearance. She is not ill-appearing.   HENT:      Head: Normocephalic.      Nose: Nose normal.      Mouth/Throat:      Mouth: Mucous membranes are moist.   Eyes:      Extraocular Movements: Extraocular movements intact.      Conjunctiva/sclera: Conjunctivae normal.   Cardiovascular:      Rate and Rhythm: Normal rate and regular rhythm.      Pulses: Normal " pulses.      Heart sounds: No murmur heard.  Pulmonary:      Effort: Pulmonary effort is normal. No respiratory distress.      Breath sounds: Normal breath sounds. No wheezing, rhonchi or rales.   Abdominal:      General: Abdomen is flat.      Palpations: Abdomen is soft.      Tenderness: There is no abdominal tenderness. There is no guarding or rebound.   Musculoskeletal:         General: Normal range of motion.      Cervical back: Normal range of motion.      Right lower leg: No edema.      Left lower leg: No edema.   Skin:     General: Skin is warm and dry.      Coloration: Skin is not pale.   Neurological:      General: No focal deficit present.      Mental Status: She is alert and oriented to person, place, and time.   Psychiatric:         Mood and Affect: Mood normal.         Thought Content: Thought content normal.          Additional Data:     Lab Results:  Results from last 7 days   Lab Units 07/04/24  1534   WBC Thousand/uL 9.19   HEMOGLOBIN g/dL 12.9   HEMATOCRIT % 40.0   PLATELETS Thousands/uL 214   SEGS PCT % 80*   LYMPHO PCT % 8*   MONO PCT % 11   EOS PCT % 0     Results from last 7 days   Lab Units 07/04/24  1534   SODIUM mmol/L 135   POTASSIUM mmol/L 3.6   CHLORIDE mmol/L 97   CO2 mmol/L 26   BUN mg/dL 21   CREATININE mg/dL 1.05   ANION GAP mmol/L 12   CALCIUM mg/dL 9.5   ALBUMIN g/dL 4.0   TOTAL BILIRUBIN mg/dL 0.86   ALK PHOS U/L 91   ALT U/L 34   AST U/L 44*   GLUCOSE RANDOM mg/dL 128     Results from last 7 days   Lab Units 07/04/24  1610   INR  2.51*         Lab Results   Component Value Date    HGBA1C 5.4 01/24/2016     Results from last 7 days   Lab Units 07/04/24  1534   LACTIC ACID mmol/L 1.0   PROCALCITONIN ng/ml 0.21       Lines/Drains:  Invasive Devices       Peripheral Intravenous Line  Duration             Peripheral IV 07/04/24 Left;Ventral (anterior) Forearm <1 day                        Imaging: Reviewed radiology reports from this admission including: chest CT scan  CTA ED chest PE  study   Final Result by Dee Clark MD (07/04 0122)      No central, lobar, segmental or proximal subsegmental pulmonary embolism. Evaluation of the distal subsegmental pulmonary arteries is limited.      No acute pulmonary pathology.      Additional findings as above.                  Workstation performed: [id number]             EKG and Other Studies Reviewed on Admission:   EKG: Personally Reviewed.  NSR without acute ischemia.  Normal QTc.    ** Please Note: This note has been constructed using a voice recognition system. **

## 2024-07-05 NOTE — ASSESSMENT & PLAN NOTE
Home regimen: Bisoprolol 5 Mg daily, chlorthalidone 25 Mg daily, and amlodipine 5 Mg daily  Continue

## 2024-07-05 NOTE — CASE MANAGEMENT
Case Management Assessment & Discharge Planning Note    Patient name Elena Pickering  Location /-01 MRN [id number]  : 1933 Date 2024       Current Admission Date: 2024  Current Admission Diagnosis:Hypoxia   Patient Active Problem List    Diagnosis Date Noted Date Diagnosed    Hypoxia 2024     Viral URI 2024     Bronchitis 2022     Cardiac pacemaker in situ 2022     Atrial fibrillation (HCC) 2022     Acute respiratory insufficiency 2022     Ambulatory dysfunction 2016     Hypertension 2016       LOS (days): 0  Geometric Mean LOS (GMLOS) (days): 2.3  Days to GMLOS:2.2     OBJECTIVE:     Current admission status: Inpatient       Preferred Pharmacy:   [id number] - [place], [place] - [address]  [address] [place] [address]  Phone: [phone] Fax: [phone]    Primary Care Provider: Juan Guerra MD    Primary Insurance: BLUE CROSS [employer] REP  Secondary Insurance:     ASSESSMENT:  Active Health Care Proxies       Areli Spence Health Care Agent - Daughter   Primary Phone: [phone] (Mobile)  Home Phone: [phone]                 Advance Directives  Does patient have a Health Care POA?: Yes  Does patient have Advance Directives?: Yes  Advance Directives: Living will, Power of  for health care  Primary Contact: Areli Spence: dtr: [phone]; [phone]    Readmission Root Cause  30 Day Readmission: No    Patient Information  Admitted from:: Home  Mental Status: Alert  During Assessment patient was accompanied by: Not accompanied during assessment  Assessment information provided by:: Daughter  Support Systems: Daughter  County of Residence: [place]  What city do you live in?: [place]  Home entry access options. Select all that apply.: No steps to enter home  Type of Current Residence: Facility  Upon entering residence, is there a bedroom on the main floor (no further steps)?:  Yes  Upon entering residence, is there a bathroom on the main floor (no further steps)?: Yes  Living Arrangements: Lives w/ Daughter  Is patient a ?: No    Activities of Daily Living Prior to Admission  Functional Status: Independent  Completes ADLs independently?: Yes  Ambulates independently?: Yes (ambulates with cane)  Does patient use assisted devices?: Yes  Assisted Devices (DME) used: Straight Cane  Does patient currently own DME?: Yes  What DME does the patient currently own?: Straight Cane  Does patient have a history of Outpatient Therapy (PT/OT)?: No  Does the patient have a history of Short-Term Rehab?: No  Does patient have a history of HHC?: Yes  Does patient currently have HHC?: No    Patient Information Continued  Income Source: Pension/nursing home  Does patient have prescription coverage?: Yes  Does patient receive dialysis treatments?: No  Does patient have a history of substance abuse?: No  Does patient have a history of Mental Health Diagnosis?: No    Means of Transportation  Means of Transport to [facility]:: Family transport      Social Determinants of Health (SDOH)      Flowsheet Row Most Recent Value   Housing Stability    In the last 12 months, was there a time when you were not able to pay the mortgage or rent on time? N   In the past 12 months, how many times have you moved where you were living? 0   At any time in the past 12 months, were you homeless or living in a shelter (including now)? N   Transportation Needs    In the past 12 months, has lack of transportation kept you from medical appointments or from getting medications? no   In the past 12 months, has lack of transportation kept you from meetings, work, or from getting things needed for daily living? No   Food Insecurity    Within the past 12 months, you worried that your food would run out before you got the money to buy more. Never true   Within the past 12 months, the food you bought just didn't last and you didn't have money  to get more. Never true   Utilities    In the past 12 months has the electric, gas, oil, or water company threatened to shut off services in your home? No            DISCHARGE DETAILS:    Discharge planning discussed with:: dtr: Areli Garner  Freedom of Choice: Yes  Comments - Freedom of Choice: return home upon discharge  CM contacted family/caregiver?: Yes  Were Treatment Team discharge recommendations reviewed with patient/caregiver?: Yes    Contacts  Patient Contacts: Elena Spence: dtr  Relationship to Patient:: Family  Contact Method: Phone  Phone Number: [phone]  Reason/Outcome: Emergency Contact, Continuity of Care, Discharge Planning    Additional Comments: Call placed to Pt's dtr(Areli: [phone]; [phone]), discussed role of case management. Pt's dtr reports Pt lives with her in mobile home, 5 [address] with railings. Pt ambulates with cane. Pt's dtr and son are POA and Pt has living will. Pt uses [facility] in [place] and able to afford medications. Pt has hx of of VNA and denies hx of SNF/MH/D&A. Pt's dtr reports plan to return home upon discharge. Met with Pt.  Pt alert and oriented. Discussed role of case managment. Pt confirms she lives with her dtr in mobile home and reports she uses cane and owns walker. Pt confirms dtr provides transport and does grocery shopping. Pt plans to return home upon discharge. Pt's dtr to transport home. Await home O2 eval. CM to follow.

## 2024-07-05 NOTE — PLAN OF CARE
Problem: PAIN - ADULT  Goal: Verbalizes/displays adequate comfort level or baseline comfort level  Description: Interventions:  - Encourage patient to monitor pain and request assistance  - Assess pain using appropriate pain scale  - Administer analgesics based on type and severity of pain and evaluate response  - Implement non-pharmacological measures as appropriate and evaluate response  - Consider cultural and social influences on pain and pain management  - Notify physician/advanced practitioner if interventions unsuccessful or patient reports new pain  Outcome: Progressing     Problem: INFECTION - ADULT  Goal: Absence or prevention of progression during hospitalization  Description: INTERVENTIONS:  - Assess and monitor for signs and symptoms of infection  - Monitor lab/diagnostic results  - Monitor all insertion sites, i.e. indwelling lines, tubes, and drains  - Monitor endotracheal if appropriate and nasal secretions for changes in amount and color  - [place] appropriate cooling/warming therapies per order  - Administer medications as ordered  - Instruct and encourage patient and family to use good hand hygiene technique  - Identify and instruct in appropriate isolation precautions for identified infection/condition  Outcome: Progressing  Goal: Absence of fever/infection during neutropenic period  Description: INTERVENTIONS:  - Monitor WBC    Outcome: Progressing     Problem: SAFETY ADULT  Goal: Patient will remain free of falls  Description: INTERVENTIONS:  - Educate patient/family on patient safety including physical limitations  - Instruct patient to call for assistance with activity   - Consult OT/PT to assist with strengthening/mobility   - Keep Call bell within reach  - Keep bed low and locked with side rails adjusted as appropriate  - Keep care items and personal belongings within reach  - Initiate and maintain comfort rounds  - Make Fall Risk Sign visible to staff  - Offer Toileting every 2 Hours,  in advance of need  - Initiate/Maintain bed alarm  - Obtain necessary fall risk management equipment:   - Apply yellow socks and bracelet for high fall risk patients  - Consider moving patient to room near nurses station  Outcome: Progressing  Goal: Maintain or return to baseline ADL function  Description: INTERVENTIONS:  -  Assess patient's ability to carry out ADLs; assess patient's baseline for ADL function and identify physical deficits which impact ability to perform ADLs (bathing, care of mouth/teeth, toileting, grooming, dressing, etc.)  - Assess/evaluate cause of self-care deficits   - Assess range of motion  - Assess patient's mobility; develop plan if impaired  - Assess patient's need for assistive devices and provide as appropriate  - Encourage maximum independence but intervene and supervise when necessary  - Involve family in performance of ADLs  - Assess for home care needs following discharge   - Consider OT consult to assist with ADL evaluation and planning for discharge  - Provide patient education as appropriate  Outcome: Progressing  Goal: Maintains/Returns to pre admission functional level  Description: INTERVENTIONS:  - Perform AM-PAC 6 Click Basic Mobility/ Daily Activity assessment daily.  - Set and communicate daily mobility goal to care team and patient/family/caregiver.   - Collaborate with rehabilitation services on mobility goals if consulted  - Perform Range of Motion 2 times a day.  - Reposition patient every 2 hours.  - Dangle patient 2 times a day  - Stand patient 2 times a day  - Ambulate patient 2 times a day  - Out of bed to chair 2 times a day   - Out of bed for meals 2 times a day  - Out of bed for toileting  - Record patient progress and toleration of activity level   Outcome: Progressing     Problem: DISCHARGE PLANNING  Goal: Discharge to home or other facility with appropriate resources  Description: INTERVENTIONS:  - Identify barriers to discharge w/patient and caregiver  -  Arrange for needed discharge resources and transportation as appropriate  - Identify discharge learning needs (meds, wound care, etc.)  - Arrange for interpretive services to assist at discharge as needed  - Refer to Case Management Department for coordinating discharge planning if the patient needs post-hospital services based on physician/advanced practitioner order or complex needs related to functional status, cognitive ability, or social support system  Outcome: Progressing     Problem: Knowledge Deficit  Goal: Patient/family/caregiver demonstrates understanding of disease process, treatment plan, medications, and discharge instructions  Description: Complete learning assessment and assess knowledge base.  Interventions:  - Provide teaching at level of understanding  - Provide teaching via preferred learning methods  Outcome: Progressing

## 2024-07-06 VITALS
HEART RATE: 61 BPM | SYSTOLIC BLOOD PRESSURE: 108 MMHG | BODY MASS INDEX: 32.29 KG/M2 | RESPIRATION RATE: 18 BRPM | TEMPERATURE: 97.2 F | OXYGEN SATURATION: 96 % | DIASTOLIC BLOOD PRESSURE: 62 MMHG | WEIGHT: 193.78 LBS | HEIGHT: 65 IN

## 2024-07-06 PROBLEM — R78.81 BACTEREMIA: Status: ACTIVE | Noted: 2024-07-06

## 2024-07-06 LAB
ANION GAP SERPL CALCULATED.3IONS-SCNC: 7 MMOL/L (ref 4–13)
BASOPHILS # BLD AUTO: 0.01 THOUSANDS/ÂΜL (ref 0–0.1)
BASOPHILS NFR BLD AUTO: 0 % (ref 0–1)
BUN SERPL-MCNC: 23 MG/DL (ref 5–25)
CALCIUM SERPL-MCNC: 9.3 MG/DL (ref 8.4–10.2)
CHLORIDE SERPL-SCNC: 102 MMOL/L (ref 96–108)
CO2 SERPL-SCNC: 27 MMOL/L (ref 21–32)
CREAT SERPL-MCNC: 0.98 MG/DL (ref 0.6–1.3)
EOSINOPHIL # BLD AUTO: 0 THOUSAND/ÂΜL (ref 0–0.61)
EOSINOPHIL NFR BLD AUTO: 0 % (ref 0–6)
ERYTHROCYTE [DISTWIDTH] IN BLOOD BY AUTOMATED COUNT: 12.7 % (ref 11.6–15.1)
GFR SERPL CREATININE-BSD FRML MDRD: 50 ML/MIN/1.73SQ M
GLUCOSE SERPL-MCNC: 140 MG/DL (ref 65–140)
HCT VFR BLD AUTO: 36.4 % (ref 34.8–46.1)
HGB BLD-MCNC: 11.6 G/DL (ref 11.5–15.4)
IMM GRANULOCYTES # BLD AUTO: 0.05 THOUSAND/UL (ref 0–0.2)
IMM GRANULOCYTES NFR BLD AUTO: 1 % (ref 0–2)
INR PPP: 3.85 (ref 0.84–1.19)
LYMPHOCYTES # BLD AUTO: 0.73 THOUSANDS/ÂΜL (ref 0.6–4.47)
LYMPHOCYTES NFR BLD AUTO: 8 % (ref 14–44)
MAGNESIUM SERPL-MCNC: 2.3 MG/DL (ref 1.9–2.7)
MCH RBC QN AUTO: 30.4 PG (ref 26.8–34.3)
MCHC RBC AUTO-ENTMCNC: 31.9 G/DL (ref 31.4–37.4)
MCV RBC AUTO: 95 FL (ref 82–98)
MONOCYTES # BLD AUTO: 0.6 THOUSAND/ÂΜL (ref 0.17–1.22)
MONOCYTES NFR BLD AUTO: 7 % (ref 4–12)
NEUTROPHILS # BLD AUTO: 7.42 THOUSANDS/ÂΜL (ref 1.85–7.62)
NEUTS SEG NFR BLD AUTO: 84 % (ref 43–75)
NRBC BLD AUTO-RTO: 0 /100 WBCS
PLATELET # BLD AUTO: 210 THOUSANDS/UL (ref 149–390)
PMV BLD AUTO: 9.3 FL (ref 8.9–12.7)
POTASSIUM SERPL-SCNC: 3.7 MMOL/L (ref 3.5–5.3)
PROCALCITONIN SERPL-MCNC: 0.89 NG/ML
PROTHROMBIN TIME: 38.4 SECONDS (ref 11.6–14.5)
RBC # BLD AUTO: 3.82 MILLION/UL (ref 3.81–5.12)
SODIUM SERPL-SCNC: 136 MMOL/L (ref 135–147)
WBC # BLD AUTO: 8.81 THOUSAND/UL (ref 4.31–10.16)

## 2024-07-06 PROCEDURE — 85610 PROTHROMBIN TIME: CPT | Performed by: INTERNAL MEDICINE

## 2024-07-06 PROCEDURE — 85025 COMPLETE CBC W/AUTO DIFF WBC: CPT | Performed by: INTERNAL MEDICINE

## 2024-07-06 PROCEDURE — 94761 N-INVAS EAR/PLS OXIMETRY MLT: CPT

## 2024-07-06 PROCEDURE — 94640 AIRWAY INHALATION TREATMENT: CPT

## 2024-07-06 PROCEDURE — 94760 N-INVAS EAR/PLS OXIMETRY 1: CPT

## 2024-07-06 PROCEDURE — 84145 PROCALCITONIN (PCT): CPT | Performed by: INTERNAL MEDICINE

## 2024-07-06 PROCEDURE — 99232 SBSQ HOSP IP/OBS MODERATE 35: CPT | Performed by: INTERNAL MEDICINE

## 2024-07-06 PROCEDURE — 83735 ASSAY OF MAGNESIUM: CPT | Performed by: INTERNAL MEDICINE

## 2024-07-06 PROCEDURE — 99239 HOSP IP/OBS DSCHRG MGMT >30: CPT | Performed by: INTERNAL MEDICINE

## 2024-07-06 PROCEDURE — 87040 BLOOD CULTURE FOR BACTERIA: CPT | Performed by: INTERNAL MEDICINE

## 2024-07-06 PROCEDURE — 80048 BASIC METABOLIC PNL TOTAL CA: CPT | Performed by: INTERNAL MEDICINE

## 2024-07-06 PROCEDURE — 94664 DEMO&/EVAL PT USE INHALER: CPT

## 2024-07-06 RX ORDER — WARFARIN SODIUM 2 MG/1
4 TABLET ORAL
Start: 2024-07-01

## 2024-07-06 RX ORDER — PREDNISONE 20 MG/1
20 TABLET ORAL DAILY
Qty: 3 TABLET | Refills: 0 | Status: SHIPPED | OUTPATIENT
Start: 2024-07-07 | End: 2024-07-10

## 2024-07-06 RX ORDER — LEVALBUTEROL INHALATION SOLUTION 1.25 MG/3ML
1.25 SOLUTION RESPIRATORY (INHALATION)
Status: DISCONTINUED | OUTPATIENT
Start: 2024-07-06 | End: 2024-07-06 | Stop reason: HOSPADM

## 2024-07-06 RX ORDER — CIPROFLOXACIN 500 MG/1
500 TABLET, FILM COATED ORAL EVERY 12 HOURS SCHEDULED
Qty: 5 TABLET | Refills: 0 | Status: SHIPPED | OUTPATIENT
Start: 2024-07-06 | End: 2024-07-09

## 2024-07-06 RX ADMIN — PANTOPRAZOLE SODIUM 40 MG: 40 TABLET, DELAYED RELEASE ORAL at 04:36

## 2024-07-06 RX ADMIN — PRAVASTATIN SODIUM 20 MG: 20 TABLET ORAL at 08:26

## 2024-07-06 RX ADMIN — OXYCODONE HYDROCHLORIDE AND ACETAMINOPHEN 500 MG: 500 TABLET ORAL at 08:26

## 2024-07-06 RX ADMIN — LEVALBUTEROL HYDROCHLORIDE 1.25 MG: 1.25 SOLUTION RESPIRATORY (INHALATION) at 07:39

## 2024-07-06 RX ADMIN — PREDNISONE 40 MG: 20 TABLET ORAL at 08:27

## 2024-07-06 RX ADMIN — METHENAMINE HIPPURATE 1 G: 1 TABLET ORAL at 08:26

## 2024-07-06 RX ADMIN — CHLORTHALIDONE 25 MG: 25 TABLET ORAL at 08:44

## 2024-07-06 RX ADMIN — CIPROFLOXACIN 500 MG: 500 TABLET ORAL at 08:44

## 2024-07-06 NOTE — ASSESSMENT & PLAN NOTE
Home regimen: Bisoprolol 5 Mg daily and AC with Coumadin 3mg on Thursday and 4 mg the remaining days   INR 2.51 on admit, has not yet taken Coumadin this evening  INR is 3.85  Hold Coumadin and repeat PT/INR on Monday and restart if INR is less than 2.5

## 2024-07-06 NOTE — UTILIZATION REVIEW
NOTIFICATION OF INPATIENT ADMISSION   AUTHORIZATION REQUEST   SERVICING FACILITY:   [place] [address]  Tax ID: [id number]  NPI: [id number] ATTENDING PROVIDER:  Attending Name and NPI#: Syed Butt Md [[id number]]  Address: [address] [place] [address]  Phone: [phone]   ADMISSION INFORMATION:  Place of Service: Inpatient [facility]  Place of Service Code: 21  Inpatient Admission Date/Time: 7/5/24  2:09 PM  Discharge Date/Time: 7/6/2024  2:34 PM  Admitting Diagnosis Code/Description:  Altered mental status [R41.82]  URI (upper respiratory infection) [J06.9]  Hypoxia [R09.02]  Fever [R50.9]     UTILIZATION REVIEW CONTACT:  Lashell Joseph, Utilization   Network Utilization Review Department  Phone: [phone]  Fax: [phone]  Email: [email or web address]  Contact for approvals/pending authorizations, clinical reviews, and discharge.     PHYSICIAN ADVISORY SERVICES:  Medical Necessity Denial & [facility] Review  Phone: [phone]  Fax: [phone]  Email: [email or web address]     DISCHARGE SUPPORT TEAM:  For Patients Discharge Needs & Updates  Phone: [phone] opt. 2 Fax: [phone]  Email: [email or web address]

## 2024-07-06 NOTE — NURSING NOTE
Patient to be discharged to home in stable condition. Instructions given to daughter, who will be taking patient home.

## 2024-07-06 NOTE — RESPIRATORY THERAPY NOTE
Home Oxygen Qualifying Test     Patient name: Elena Pickering        : 1933   Date of Test:  2024  Diagnosis:    Home Oxygen Test:    Medicare Guidelines require item(s) 1-5 on all ambulatory patients or 1 and 2 on non-ambulatory patients.    1. Baseline SPO2 on Room Air at rest 95 %   If <= 88% on Room Air add O2 via NC to obtain SpO2 >=88%. If LPM needed, document LPM  needed to reach =>88%    SPO2 during exertion on Room Air 93  %  During exertion monitor SPO2. If SPO2 increases >=89%, do not add supplemental oxygen    SPO2 on Oxygen at Rest % at  LPM    SPO2 during exertion on Oxygen  % at  LPM    Test performed during exertion activity.      []  Supplemental Home Oxygen is indicated.    [x]  Client does not qualify for home oxygen.    Respiratory Additional Notes-     Dax Navarro, RT

## 2024-07-06 NOTE — ASSESSMENT & PLAN NOTE
Patient 1 out of 2 blood culture showing coagulase-negative staph [place] contaminant  Patient is afebrile and has no symptoms  Blood cultures ordered  Discussed with patient and daughter that we will follow-up the result and communicate if anything is concerning or positive

## 2024-07-06 NOTE — PLAN OF CARE
Problem: PAIN - ADULT  Goal: Verbalizes/displays adequate comfort level or baseline comfort level  Description: Interventions:  - Encourage patient to monitor pain and request assistance  - Assess pain using appropriate pain scale  - Administer analgesics based on type and severity of pain and evaluate response  - Implement non-pharmacological measures as appropriate and evaluate response  - Consider cultural and social influences on pain and pain management  - Notify physician/advanced practitioner if interventions unsuccessful or patient reports new pain  Outcome: Progressing     Problem: INFECTION - ADULT  Goal: Absence of fever/infection during neutropenic period  Description: INTERVENTIONS:  - Monitor WBC    Outcome: Progressing     Problem: SAFETY ADULT  Goal: Patient will remain free of falls  Description: INTERVENTIONS:  - Educate patient/family on patient safety including physical limitations  - Instruct patient to call for assistance with activity   - Consult OT/PT to assist with strengthening/mobility   - Keep Call bell within reach  - Keep bed low and locked with side rails adjusted as appropriate  - Keep care items and personal belongings within reach  - Initiate and maintain comfort rounds  - Make Fall Risk Sign visible to staff  - Offer Toileting every 2 Hours, in advance of need  - Initiate/Maintain bed alarm  - Obtain necessary fall risk management equipment:   - Apply yellow socks and bracelet for high fall risk patients  - Consider moving patient to room near nurses station  Outcome: Progressing  Goal: Maintain or return to baseline ADL function  Description: INTERVENTIONS:  -  Assess patient's ability to carry out ADLs; assess patient's baseline for ADL function and identify physical deficits which impact ability to perform ADLs (bathing, care of mouth/teeth, toileting, grooming, dressing, etc.)  - Assess/evaluate cause of self-care deficits   - Assess range of motion  - Assess patient's  mobility; develop plan if impaired  - Assess patient's need for assistive devices and provide as appropriate  - Encourage maximum independence but intervene and supervise when necessary  - Involve family in performance of ADLs  - Assess for home care needs following discharge   - Consider OT consult to assist with ADL evaluation and planning for discharge  - Provide patient education as appropriate  Outcome: Progressing  Goal: Maintains/Returns to pre admission functional level  Description: INTERVENTIONS:  - Perform AM-PAC 6 Click Basic Mobility/ Daily Activity assessment daily.  - Set and communicate daily mobility goal to care team and patient/family/caregiver.   - Collaborate with rehabilitation services on mobility goals if consulted  - Perform Range of Motion 2 times a day.  - Reposition patient every 2 hours.  - Dangle patient 2 times a day  - Stand patient 2 times a day  - Ambulate patient 2 times a day  - Out of bed to chair 2 times a day   - Out of bed for meals 2 times a day  - Out of bed for toileting  - Record patient progress and toleration of activity level   Outcome: Progressing     Problem: DISCHARGE PLANNING  Goal: Discharge to home or other facility with appropriate resources  Description: INTERVENTIONS:  - Identify barriers to discharge w/patient and caregiver  - Arrange for needed discharge resources and transportation as appropriate  - Identify discharge learning needs (meds, wound care, etc.)  - Arrange for interpretive services to assist at discharge as needed  - Refer to Case Management Department for coordinating discharge planning if the patient needs post-hospital services based on physician/advanced practitioner order or complex needs related to functional status, cognitive ability, or social support system  Outcome: Progressing

## 2024-07-06 NOTE — PROGRESS NOTES
"Progress Note - Pulmonary   Elena Pickering 89 y.o. female MRN: [id number]  Unit/Bed#: -01 Encounter: [id number]    Assessment/Plan:  Patient is a 89-year-old female who present with acute hypoxemic respiratory failure.  Likely in a setting of degree of bronchitis.  She does have a positive procalcitonin therefore may complete current course of ciprofloxacin for approximate 5 to 7 days.  May consider discontinuation of prednisone.  Would recommend checking amatory pulse ox prior to discharge.    Bronchitis  -  + procalcitonin  -  currently on ciprofloxacin   -May complete current course.    Acute hypoxemic respiratory   -  improved  - not requiring oxygen at rest  -  check ambulatory pulse ox  -  consider stopping prednisone  -  IS/OOB  -  PRN albuterol nebulizer    Atrial fibrillation  -  rate controlled    Plan of care discussed with EMERALD.    Chief Complaint:   Doing well    Subjective:   No acute vents or night.  Patient reports her breathing is significantly improved.  She has no fevers chills nausea or vomiting.  She is eager to go home.    Objective:     Vitals: Blood pressure 109/60, pulse 63, temperature 98.2 °F (36.8 °C), temperature source Oral, resp. rate 18, height 5' 4.5\" (1.638 m), weight 87.9 kg (193 lb 12.6 oz), SpO2 90%, not currently breastfeeding.,Body mass index is 32.75 kg/m².      Intake/Output Summary (Last 24 hours) at 7/6/2024 0719  Last data filed at 7/6/2024 0501  Gross per 24 hour   Intake 2150 ml   Output 450 ml   Net 1700 ml       Invasive Devices       Peripheral Intravenous Line  Duration             Peripheral IV 07/04/24 Left;Ventral (anterior) Forearm 1 day    Peripheral IV 07/05/24 Dorsal (posterior);Right Hand <1 day                    Physical Exam:    Physical Exam  Constitutional:       General: She is not in acute distress.     Appearance: Normal appearance. She is obese. She is not ill-appearing or diaphoretic.   HENT:      Head: Normocephalic and atraumatic. "   Cardiovascular:      Rate and Rhythm: Normal rate and regular rhythm.   Pulmonary:      Effort: Pulmonary effort is normal.      Breath sounds: Normal breath sounds.   Abdominal:      General: There is no distension.      Tenderness: There is no abdominal tenderness. There is no guarding.   Neurological:      General: No focal deficit present.      Mental Status: She is alert and oriented to person, place, and time.   Psychiatric:         Mood and Affect: Mood normal.         Behavior: Behavior normal.         Thought Content: Thought content normal.         Judgment: Judgment normal.       Labs: I have personally reviewed pertinent lab results.  Lab Results   Component Value Date    SODIUM 136 07/06/2024    K 3.7 07/06/2024     07/06/2024    CO2 27 07/06/2024    BUN 23 07/06/2024    CREATININE 0.98 07/06/2024    GLUC 140 07/06/2024    CALCIUM 9.3 07/06/2024     Lab Results   Component Value Date    WBC 8.81 07/06/2024    HGB 11.6 07/06/2024    HCT 36.4 07/06/2024    MCV 95 07/06/2024     07/06/2024     Imaging and other studies: I have personally reviewed pertinent reports.   and I have personally reviewed pertinent films in PACS  CTA PE 7/4/2024:  PULMONARY ARTERIAL TREE:  No central, lobar, segmental or proximal subsegmental pulmonary embolism. Evaluation of the distal subsegmental pulmonary arteries is limited.  BRONCHOPULMONARY: Clear central airways. Evaluation of lung parenchyma somewhat limited by motion artifact. Some scattered linear opacities are seen likely areas of atelectasis or scarring. No discrete airspace opacities to suggest pneumonia.  PLEURA:  No pleural effusions. No pneumothorax  HEART/GREAT VESSELS: There is mild cardiomegaly. No pericardial effusion. A dual-lead left-sided pacemaker is in place.  Normal caliber thoracic aorta. No thoracic aortic dissection. Atherosclerotic calcifications are seen in the thoracic aorta and its major branches.  MEDIASTINUM/LYMPH NODES:  No  axillary, mediastinal or hilar lymphadenopathy.  CHEST WALL AND LOWER NECK:  Unremarkable.

## 2024-07-06 NOTE — DISCHARGE INSTR - AVS FIRST PAGE
Follow-up with PCP and pulmonary as outpatient  Hold Coumadin and repeat PT/INR on Monday and restart if INR is less than 2.5

## 2024-07-06 NOTE — ASSESSMENT & PLAN NOTE
Developed flulike symptoms last week and seen at [facility] on 7/1 with negative chest x-ray, but was started on Cipro due to concern for bronchitis  Flulike symptoms have markedly improved but patient was significantly fatigued with generalized weakness this morning, so daughter called EMS  Patient still with fever, however CT chest without evidence of bacterial infection, UA without infection as well  Suspect lingering viral URI symptoms-patient reports marked improvement in cough as well  Supportive care  Pulmonary consult appreciated  Patient is feeling better and cleared by pulmonary for discharge.  Patient had home O2 evaluation done and does not require home O2 at discharge

## 2024-07-06 NOTE — DISCHARGE SUMMARY
[facility]  Discharge- Elena Pickering 11/22/1933, 89 y.o. female MRN: [id number]  Unit/Bed#: -Giovanni Encounter: [id number]  Primary Care Provider: Juan Guerra MD   Date and time admitted to hospital: 7/4/2024  2:49 PM    Bacteremia  Assessment & Plan  Patient 1 out of 2 blood culture showing coagulase-negative staph [place] contaminant  Patient is afebrile and has no symptoms  Blood cultures ordered  Discussed with patient and daughter that we will follow-up the result and communicate if anything is concerning or positive    Viral URI  Assessment & Plan  Developed flulike symptoms last week and seen at [facility] on 7/1 with negative chest x-ray, but was started on Cipro due to concern for bronchitis  Flulike symptoms have markedly improved but patient was significantly fatigued with generalized weakness this morning, so daughter called EMS  Patient still with fever, however CT chest without evidence of bacterial infection, UA without infection as well  Suspect lingering viral URI symptoms-patient reports marked improvement in cough as well  Supportive care  Pulmonary consult appreciated  Patient is feeling better and cleared by pulmonary for discharge.  Patient had home O2 evaluation done and does not require home O2 at discharge    Atrial fibrillation (HCC)  Assessment & Plan  Home regimen: Bisoprolol 5 Mg daily and AC with Coumadin 3mg on Thursday and 4 mg the remaining days   INR 2.51 on admit, has not yet taken Coumadin this evening  INR is 3.85  Hold Coumadin and repeat PT/INR on Monday and restart if INR is less than 2.5    Cardiac pacemaker in situ  Assessment & Plan  Placed in September 2021 at [facility]    Hypertension  Assessment & Plan  Home regimen: Bisoprolol 5 Mg daily, chlorthalidone 25 Mg daily, and amlodipine 5 Mg daily  Continue    * Hypoxia  Assessment & Plan  SpO2 stable at rest on room air, however with ambulation SpO2 does fluctuate  between 88-90%  CT chest without any acute abnormalities, suspect atelectasis contributory and residual viral URI the patient was diagnosed with on 7/1 and has completed antibiotics  Encourage incentive spirometry  Ambulation 3 times daily  Continue on bronchodilators  Will discharge on 3 more days of prednisone  Patient does not require home O2 at discharge      Hospital Course:     Elena Pickering is a 89 y.o. female patient who originally presented to the hospital on   Admission Orders (From admission, onward)       Ordered        07/05/24 1409  INPATIENT ADMISSION  Once            07/04/24 2024  Place in Observation  Once                         due to coughing and feeling short of breath.  Patient was seen in ER and started on Cipro for likely bronchitis.  Patient was admitted and chest x-ray is unremarkable.  CTA chest showed no PE.  She was seen by pulmonary and felt patient's symptoms are likely due to viral colitis.  Patient was started on prednisone and is recommended to use albuterol inhaler.  Patient was found to have 1 out of 2 blood cultures positive which are likely contaminant.  Repeat blood cultures were done.    Patient is feeling much better.  Patient had home O2 evaluation done and did not require home oxygen at discharge.  Patient is hemodynamically stable for discharge.  On Exam-  Chest-bilateral air entry, clear to auscultation  Abdomen-soft, nontender  Heart-S1 S2 regular  Extremities-no pedal edema or calf tenderness  Neuro-alert awake oriented x3.  No focal deficits    Please see above list of diagnoses and related plan for additional information.   Follow-up with PCP and pulmonary as outpatient  Hold Coumadin and repeat PT/INR on Monday and restart if INR is less than 2.5    Condition at Discharge:  good      Discharge instructions/Information to patient and family:   See after visit summary for information provided to patient and family.      Provisions for Follow-Up Care:  See after  visit summary for information related to follow-up care and any pertinent home health orders.      Disposition:     Home       Discharge Statement:  I spent 40 minutes discharging the patient. This time was spent on the day of discharge. I had direct contact with the patient on the day of discharge. Greater than 50% of the total time was spent examining patient, answering all patient questions, arranging and discussing plan of care with patient as well as directly providing post-discharge instructions.  Additional time then spent on discharge activities.    Discharge Medications:  See after visit summary for reconciled discharge medications provided to patient and family.      ** Please Note: This note has been constructed using a voice recognition system **

## 2024-07-07 LAB
BACTERIA BLD CULT: ABNORMAL
GRAM STN SPEC: ABNORMAL
MRSA NOSE QL CULT: NORMAL
S AUREUS+CONS DNA BLD POS NAA+NON-PROBE: DETECTED

## 2024-07-08 ENCOUNTER — TELEPHONE (OUTPATIENT)
Age: 89
End: 2024-07-08

## 2024-07-08 NOTE — TELEPHONE ENCOUNTER
Would she be okay with traveling to any other campus; otherwise, I can make space for her in the end of August

## 2024-07-08 NOTE — UTILIZATION REVIEW
NOTIFICATION OF ADMISSION DISCHARGE   This is a Notification of Discharge from [facility]. Please be advised that this patient has been discharge from our facility. Below you will find the admission and discharge date and time including the patient’s disposition.   UTILIZATION REVIEW CONTACT:  Lashell Joseph  Utilization   Network Utilization Review Department  Phone: [phone] x carefully listen to the prompts. All voicemails are confidential.  Email: [email or web address]     ADMISSION INFORMATION  PRESENTATION DATE: 7/4/2024  2:49 PM  OBERVATION ADMISSION DATE: 07/04/2024 2024  INPATIENT ADMISSION DATE: 7/5/24  2:09 PM   DISCHARGE DATE: 7/6/2024  2:34 PM   DISPOSITION:Home/Self Care    Network Utilization Review Department  ATTENTION: Please call with any questions or concerns to [phone] and carefully listen to the prompts so that you are directed to the right person. All voicemails are confidential.   For Discharge needs, contact Care Management DC Support Team at [phone] opt. 2  Send all requests for admission clinical reviews, approved or denied determinations and any other requests to dedicated fax number below belonging to the campus where the patient is receiving treatment. List of dedicated fax numbers for the Facilities:  FACILITY NAME UR FAX NUMBER   ADMISSION DENIALS (Administrative/Medical Necessity) [phone]   DISCHARGE SUPPORT TEAM ([facility]) [phone]   PARENT CHILD HEALTH (Maternity/NICU/Pediatrics) [phone]   [facility] [phone]   [facility] [phone]   [facility] [phone]   [facility] [phone]   [facility] [phone]   [facility] [phone]   [facility] [phone]   [facility]  [phone]   [facility] [phone]   [facility] [phone]   [facility] [phone]   [facility] [phone]      [facility]  Discharge- Elena Pickering 11/22/1933, 89 y.o. female MRN: [id number]  Unit/Bed#: -01 Encounter: [id number]  Primary Care Provider: Juan Guerra MD   Date and time admitted to hospital: 7/4/2024  2:49 PM     Bacteremia  Assessment & Plan  Patient 1 out of 2 blood culture showing coagulase-negative staph [place] contaminant  Patient is afebrile and has no symptoms  Blood cultures ordered  Discussed with patient and daughter that we will follow-up the result and communicate if anything is concerning or positive     Viral URI  Assessment & Plan  Developed flulike symptoms last week and seen at [facility] on 7/1 with negative chest x-ray, but was started on Cipro due to concern for bronchitis  Flulike symptoms have markedly improved but patient was significantly fatigued with generalized weakness this morning, so daughter called EMS  Patient still with fever, however CT chest without evidence of bacterial infection, UA without infection as well  Suspect lingering viral URI symptoms-patient reports marked improvement in cough as well  Supportive care  Pulmonary consult appreciated  Patient is feeling better and cleared by pulmonary for discharge.  Patient had home O2 evaluation done and does not require home O2 at discharge     Atrial fibrillation (HCC)  Assessment & Plan  Home regimen: Bisoprolol 5 Mg daily and AC with Coumadin 3mg on Thursday and 4 mg the remaining days   INR 2.51 on admit, has not yet taken Coumadin this evening  INR is 3.85  Hold Coumadin and repeat PT/INR on Monday and restart if INR is less than 2.5     Cardiac pacemaker in situ  Assessment & Plan  Placed in September 2021 at [place]  [facility]     Hypertension  Assessment & Plan  Home regimen: Bisoprolol 5 Mg daily, chlorthalidone 25 Mg daily, and amlodipine 5 Mg daily  Continue     * Hypoxia  Assessment & Plan  SpO2 stable at rest on room air, however with ambulation SpO2 does fluctuate between 88-90%  CT chest without any acute abnormalities, suspect atelectasis contributory and residual viral URI the patient was diagnosed with on 7/1 and has completed antibiotics  Encourage incentive spirometry  Ambulation 3 times daily  Continue on bronchodilators  Will discharge on 3 more days of prednisone  Patient does not require home O2 at discharge        Hospital Course:      Elena Pickering is a 89 y.o. female patient who originally presented to the hospital on   Admission Orders (From admission, onward)          Ordered         07/05/24 1409   INPATIENT ADMISSION  Once             07/04/24 2024   Place in Observation  Once                             due to coughing and feeling short of breath.  Patient was seen in ER and started on Cipro for likely bronchitis.  Patient was admitted and chest x-ray is unremarkable.  CTA chest showed no PE.  She was seen by pulmonary and felt patient's symptoms are likely due to viral colitis.  Patient was started on prednisone and is recommended to use albuterol inhaler.  Patient was found to have 1 out of 2 blood cultures positive which are likely contaminant.  Repeat blood cultures were done.    Patient is feeling much better.  Patient had home O2 evaluation done and did not require home oxygen at discharge.  Patient is hemodynamically stable for discharge.  On Exam-  Chest-bilateral air entry, clear to auscultation  Abdomen-soft, nontender  Heart-S1 S2 regular  Extremities-no pedal edema or calf tenderness  Neuro-alert awake oriented x3.  No focal deficits     Please see above list of diagnoses and related plan for additional information.   Follow-up with PCP and pulmonary as outpatient  Hold Coumadin and repeat  PT/INR on Monday and restart if INR is less than 2.5     Condition at Discharge:  good        Discharge instructions/Information to patient and family:   See after visit summary for information provided to patient and family.       Provisions for Follow-Up Care:  See after visit summary for information related to follow-up care and any pertinent home health orders.       Disposition:      Home        Discharge Statement:  I spent 40 minutes discharging the patient. This time was spent on the day of discharge. I had direct contact with the patient on the day of discharge. Greater than 50% of the total time was spent examining patient, answering all patient questions, arranging and discussing plan of care with patient as well as directly providing post-discharge instructions.  Additional time then spent on discharge activities.     Discharge Medications:  See after visit summary for reconciled discharge medications provided to patient and family.       ** Please Note: This note has been constructed using a voice recognition system **

## 2024-07-08 NOTE — TELEPHONE ENCOUNTER
Pt's daughter Areli calling in to schedule pts HFU. Pt only wants to see Dr. Bright. Advised he did not have any openings between now and October. Offered openings with [facility]. Pt declined. I advised I could send a message to the provider and see if he's ok with her waiting until October

## 2024-07-09 LAB — BACTERIA BLD CULT: NORMAL

## 2024-07-11 LAB
BACTERIA BLD CULT: NORMAL
BACTERIA BLD CULT: NORMAL

## 2024-08-03 PROBLEM — J06.9 VIRAL URI: Status: RESOLVED | Noted: 2024-07-04 | Resolved: 2024-08-03

## 2025-01-15 ENCOUNTER — HOSPITAL ENCOUNTER (EMERGENCY)
Facility: HOSPITAL | Age: OVER 89
Discharge: HOME | End: 2025-01-15
Attending: EMERGENCY MEDICINE
Payer: COMMERCIAL

## 2025-01-15 ENCOUNTER — APPOINTMENT (EMERGENCY)
Dept: RADIOLOGY | Facility: HOSPITAL | Age: OVER 89
End: 2025-01-15
Payer: COMMERCIAL

## 2025-01-15 ENCOUNTER — APPOINTMENT (EMERGENCY)
Dept: CT IMAGING | Facility: HOSPITAL | Age: OVER 89
End: 2025-01-15
Payer: COMMERCIAL

## 2025-01-15 VITALS
HEART RATE: 60 BPM | TEMPERATURE: 98.2 F | SYSTOLIC BLOOD PRESSURE: 138 MMHG | RESPIRATION RATE: 18 BRPM | DIASTOLIC BLOOD PRESSURE: 65 MMHG | OXYGEN SATURATION: 66 %

## 2025-01-15 DIAGNOSIS — E87.6 HYPOKALEMIA: ICD-10-CM
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
## 2025-01-15 DIAGNOSIS — E83.42 HYPOMAGNESEMIA: ICD-10-CM

## 2025-01-15 DIAGNOSIS — K52.9 ENTERITIS: ICD-10-CM

## 2025-01-15 DIAGNOSIS — R42 LIGHTHEADEDNESS: Primary | ICD-10-CM

## 2025-01-15 LAB
2HR DELTA HS TROPONIN: 0 NG/L
ALBUMIN SERPL BCG-MCNC: 4.2 G/DL (ref 3.5–5)
ALP SERPL-CCNC: 53 U/L (ref 34–104)
ALT SERPL W P-5'-P-CCNC: 20 U/L (ref 7–52)
ANION GAP SERPL CALCULATED.3IONS-SCNC: 11 MMOL/L (ref 4–13)
APTT PPP: 37 SECONDS (ref 23–34)
AST SERPL W P-5'-P-CCNC: 29 U/L (ref 13–39)
BASOPHILS # BLD AUTO: 0.01 THOUSANDS/ΜL (ref 0–0.1)
BASOPHILS NFR BLD AUTO: 0 % (ref 0–1)
BILIRUB SERPL-MCNC: 0.69 MG/DL (ref 0.2–1)
BNP SERPL-MCNC: 54 PG/ML (ref 0–100)
BUN SERPL-MCNC: 21 MG/DL (ref 5–25)
CALCIUM SERPL-MCNC: 9.4 MG/DL (ref 8.4–10.2)
CARDIAC TROPONIN I PNL SERPL HS: 15 NG/L (ref ?–50)
CARDIAC TROPONIN I PNL SERPL HS: 15 NG/L (ref ?–50)
CHLORIDE SERPL-SCNC: 101 MMOL/L (ref 96–108)
CO2 SERPL-SCNC: 29 MMOL/L (ref 21–32)
CREAT SERPL-MCNC: 1.4 MG/DL (ref 0.6–1.3)
EOSINOPHIL # BLD AUTO: 0.04 THOUSAND/ΜL (ref 0–0.61)
EOSINOPHIL NFR BLD AUTO: 1 % (ref 0–6)
ERYTHROCYTE [DISTWIDTH] IN BLOOD BY AUTOMATED COUNT: 14.6 % (ref 11.6–15.1)
GFR SERPL CREATININE-BSD FRML MDRD: 32 ML/MIN/1.73SQ M
GLUCOSE SERPL-MCNC: 94 MG/DL (ref 65–140)
HCT VFR BLD AUTO: 38.6 % (ref 34.8–46.1)
HGB BLD-MCNC: 12.2 G/DL (ref 11.5–15.4)
IMM GRANULOCYTES # BLD AUTO: 0.04 THOUSAND/UL (ref 0–0.2)
IMM GRANULOCYTES NFR BLD AUTO: 1 % (ref 0–2)
INR PPP: 2.16 (ref 0.85–1.19)
LYMPHOCYTES # BLD AUTO: 1.29 THOUSANDS/ΜL (ref 0.6–4.47)
LYMPHOCYTES NFR BLD AUTO: 20 % (ref 14–44)
MAGNESIUM SERPL-MCNC: 1.4 MG/DL (ref 1.9–2.7)
MCH RBC QN AUTO: 31.2 PG (ref 26.8–34.3)
MCHC RBC AUTO-ENTMCNC: 31.6 G/DL (ref 31.4–37.4)
MCV RBC AUTO: 99 FL (ref 82–98)
MONOCYTES # BLD AUTO: 0.67 THOUSAND/ΜL (ref 0.17–1.22)
MONOCYTES NFR BLD AUTO: 11 % (ref 4–12)
NEUTROPHILS # BLD AUTO: 4.33 THOUSANDS/ΜL (ref 1.85–7.62)
NEUTS SEG NFR BLD AUTO: 67 % (ref 43–75)
NRBC BLD AUTO-RTO: 0 /100 WBCS
PLATELET # BLD AUTO: 193 THOUSANDS/UL (ref 149–390)
PMV BLD AUTO: 9.5 FL (ref 8.9–12.7)
POTASSIUM SERPL-SCNC: 3.1 MMOL/L (ref 3.5–5.3)
PROT SERPL-MCNC: 6.9 G/DL (ref 6.4–8.4)
PROTHROMBIN TIME: 24.4 SECONDS (ref 12.3–15)
RBC # BLD AUTO: 3.91 MILLION/UL (ref 3.81–5.12)
SODIUM SERPL-SCNC: 141 MMOL/L (ref 135–147)
WBC # BLD AUTO: 6.38 THOUSAND/UL (ref 4.31–10.16)

## 2025-01-15 PROCEDURE — 96360 HYDRATION IV INFUSION INIT: CPT

## 2025-01-15 PROCEDURE — 70450 CT HEAD/BRAIN W/O DYE: CPT

## 2025-01-15 PROCEDURE — 99285 EMERGENCY DEPT VISIT HI MDM: CPT | Performed by: EMERGENCY MEDICINE

## 2025-01-15 PROCEDURE — 84484 ASSAY OF TROPONIN QUANT: CPT

## 2025-01-15 PROCEDURE — 83880 ASSAY OF NATRIURETIC PEPTIDE: CPT

## 2025-01-15 PROCEDURE — 83735 ASSAY OF MAGNESIUM: CPT | Performed by: EMERGENCY MEDICINE

## 2025-01-15 PROCEDURE — 80053 COMPREHEN METABOLIC PANEL: CPT

## 2025-01-15 PROCEDURE — 71045 X-RAY EXAM CHEST 1 VIEW: CPT

## 2025-01-15 PROCEDURE — 36415 COLL VENOUS BLD VENIPUNCTURE: CPT

## 2025-01-15 PROCEDURE — 85730 THROMBOPLASTIN TIME PARTIAL: CPT

## 2025-01-15 PROCEDURE — 85025 COMPLETE CBC W/AUTO DIFF WBC: CPT

## 2025-01-15 PROCEDURE — 99285 EMERGENCY DEPT VISIT HI MDM: CPT

## 2025-01-15 PROCEDURE — 85610 PROTHROMBIN TIME: CPT

## 2025-01-15 RX ORDER — LANOLIN ALCOHOL/MO/W.PET/CERES
400 CREAM (GRAM) TOPICAL ONCE
Status: COMPLETED | OUTPATIENT
Start: 2025-01-15 | End: 2025-01-15

## 2025-01-15 RX ORDER — POTASSIUM CHLORIDE 750 MG/1
10 TABLET, EXTENDED RELEASE ORAL 2 TIMES DAILY
Qty: 20 TABLET | Refills: 0 | Status: SHIPPED | OUTPATIENT
Start: 2025-01-15

## 2025-01-15 RX ORDER — POTASSIUM CHLORIDE 1500 MG/1
20 TABLET, EXTENDED RELEASE ORAL ONCE
Status: COMPLETED | OUTPATIENT
Start: 2025-01-15 | End: 2025-01-15

## 2025-01-15 RX ADMIN — MAGNESIUM OXIDE TAB 400 MG (241.3 MG ELEMENTAL MG) 400 MG: 400 (241.3 MG) TAB at 19:40

## 2025-01-15 RX ADMIN — POTASSIUM CHLORIDE 20 MEQ: 1500 TABLET, EXTENDED RELEASE ORAL at 19:39

## 2025-01-15 RX ADMIN — SODIUM CHLORIDE 500 ML: 0.9 INJECTION, SOLUTION INTRAVENOUS at 18:16

## 2025-01-15 NOTE — ED PROVIDER NOTES
Time reflects when diagnosis was documented in both MDM as applicable and the Disposition within this note       Time User Action Codes Description Comment    1/15/2025  9:32 PM Jose Yuan [R42] Lightheadedness     1/15/2025  9:32 PM Jose Yuan [W19.XXXA] Fall, initial encounter     1/15/2025  9:32 PM Jose Yuan [K52.9] Enteritis     1/15/2025  9:32 PM Jose Yuan [E87.6] Hypokalemia     1/15/2025  9:32 PM Jose Yuan [E83.42] Hypomagnesemia           ED Disposition       ED Disposition   Discharge    Condition   Stable    Date/Time   Wed Raguh 15, 2025  9:32 PM    Comment   Elena Pickering discharge to home/self care.                   Assessment & Plan       Medical Decision Making  Diff includes intracranial injury fall, no head injury but is on blood thinners, gastroenteritis, consider dehydration, electrolyte abnl, less likely vertigo, orthostatic, or arrhythmia    Amount and/or Complexity of Data Reviewed  Labs: ordered.  Radiology: ordered and independent interpretation performed.    Risk  OTC drugs.  Prescription drug management.             Medications   sodium chloride 0.9 % bolus 500 mL (0 mL Intravenous Stopped 1/15/25 1932)   potassium chloride (Klor-Con M20) CR tablet 20 mEq (20 mEq Oral Given 1/15/25 1939)   magnesium Oxide (MAG-OX) tablet 400 mg (400 mg Oral Given 1/15/25 1940)       ED Risk Strat Scores                                              History of Present Illness       Chief Complaint   Patient presents with    Dizziness     Pt was dizzy at home getting out of a recliner, pt then went to sit down and she slid to the floor. Pt denies injury. Pt denies headache or dizziness at this time.        Past Medical History:   Diagnosis Date    Ambulatory dysfunction 01/25/2016    Anxiety     Arthritis     Basal cell carcinoma     BBB (bundle branch block)     Hyperlipidemia     Hypertension     Hypoglycemia     Hypoglycemia     Pneumonia     Right knee pain       Past Surgical  History:   Procedure Laterality Date    BOWEL RESECTION      CARDIAC PACEMAKER PLACEMENT      CHOLECYSTECTOMY      HYSTERECTOMY      RESECTION SMALL BOWEL / CLOSURE ILEOSTOMY        Family History   Problem Relation Age of Onset    Stroke Mother       Social History     Tobacco Use    Smoking status: Former    Smokeless tobacco: Never   Vaping Use    Vaping status: Never Used   Substance Use Topics    Alcohol use: Never    Drug use: No     Comment: none      E-Cigarette/Vaping    E-Cigarette Use Never User       E-Cigarette/Vaping Substances    Nicotine No     THC No     CBD No     Flavoring No     Other No     Unknown No       I have reviewed and agree with the history as documented.     Hx from patient and daughter who she lives with - pmh a fibb coumadin, pacemaker.  C/o diaarhea upset stomach thru the week then just pta today sitting in chair at home and felt dizzy like lightheaded, went to stand and fell backwards, did not hit head.  Landed on back, ambulance crew assisted patient up.        Review of Systems   Constitutional:  Positive for appetite change. Negative for chills and fever.   HENT:  Negative for rhinorrhea and sore throat.    Respiratory:  Negative for shortness of breath.    Cardiovascular:  Negative for chest pain.   Gastrointestinal:  Negative for abdominal pain, constipation, diarrhea, nausea and vomiting.   Genitourinary:  Negative for dysuria and frequency.   Skin:  Negative for rash.   Neurological:  Positive for light-headedness.   All other systems reviewed and are negative.          Objective       ED Triage Vitals   Temperature Pulse Blood Pressure Respirations SpO2 Patient Position - Orthostatic VS   01/15/25 1728 01/15/25 1722 01/15/25 1722 01/15/25 1722 01/15/25 1722 01/15/25 2120   98.2 °F (36.8 °C) 65 125/60 18 98 % Lying - Orthostatic VS      Temp Source Heart Rate Source BP Location FiO2 (%) Pain Score    01/15/25 1728 -- -- -- --    Oral          Vitals      Date and Time Temp  Pulse SpO2 Resp BP Pain Score FACES Pain Rating User   01/15/25 2126 -- 60 -- 18 138/65 -- -- TRACEY   01/15/25 2123 -- 60 -- 18 131/56 -- -- TRACEY   01/15/25 2120 -- 62 -- 18 128/56 -- -- TRACEY   01/15/25 2000 -- 65 66 % 18 128/65 -- -- TRACEY   01/15/25 1900 -- 70 98 % 18 130/68 -- -- TRACEY   01/15/25 1800 -- 68 99 % 18 128/65 -- -- TRACEY   01/15/25 1728 98.2 °F (36.8 °C) -- -- -- -- -- -- TRACEY   01/15/25 1722 -- 65 98 % 18 125/60 -- -- TRACEY            Physical Exam  Vitals and nursing note reviewed.   Constitutional:       Appearance: She is well-developed.   HENT:      Head: Normocephalic and atraumatic.      Right Ear: External ear normal.      Left Ear: External ear normal.      Nose: Nose normal.   Eyes:      Conjunctiva/sclera: Conjunctivae normal.      Pupils: Pupils are equal, round, and reactive to light.   Cardiovascular:      Rate and Rhythm: Normal rate and regular rhythm.      Heart sounds: Normal heart sounds.   Pulmonary:      Effort: Pulmonary effort is normal. No respiratory distress.      Breath sounds: Normal breath sounds. No wheezing.   Abdominal:      General: Bowel sounds are normal. There is no distension.      Palpations: Abdomen is soft.      Tenderness: There is no abdominal tenderness.   Musculoskeletal:         General: No deformity. Normal range of motion.      Cervical back: Normal range of motion and neck supple. No spinous process tenderness.   Skin:     General: Skin is warm and dry.      Findings: No rash.   Neurological:      General: No focal deficit present.      Mental Status: She is alert.      GCS: GCS eye subscore is 4. GCS verbal subscore is 5. GCS motor subscore is 6.      Sensory: No sensory deficit.   Psychiatric:         Mood and Affect: Mood normal.         Results Reviewed       Procedure Component Value Units Date/Time    HS Troponin I 2hr [[id number]]  (Normal) Collected: 01/15/25 1945    Lab Status: Final result Specimen: Blood from Arm, Right Updated: 01/15/25 2019     hs TnI 2hr 15  ng/L      Delta 2hr hsTnI 0 ng/L     Magnesium [[id number]]  (Abnormal) Collected: 01/15/25 1738    Lab Status: Final result Specimen: Blood from Arm, Right Updated: 01/15/25 1852     Magnesium 1.4 mg/dL     B-Type Natriuretic Peptide(BNP) [[id number]]  (Normal) Collected: 01/15/25 1738    Lab Status: Final result Specimen: Blood from Arm, Right Updated: 01/15/25 1846     BNP 54 pg/mL     Protime-INR [[id number]]  (Abnormal) Collected: 01/15/25 1738    Lab Status: Final result Specimen: Blood from Arm, Right Updated: 01/15/25 1811     Protime 24.4 seconds      INR 2.16    Narrative:      INR Therapeutic Range    Indication                                             INR Range      Atrial Fibrillation                                               2.0-3.0  Hypercoagulable State                                    2.0.2.3  Left Ventricular Asist Device                            2.0-3.0  Mechanical Heart Valve                                  -    Aortic(with afib, MI, embolism, HF, LA enlargement,    and/or coagulopathy)                                     2.0-3.0 (2.5-3.5)     Mitral                                                             2.5-3.5  Prosthetic/Bioprosthetic Heart Valve               2.0-3.0  Venous thromboembolism (VTE: VT, PE        2.0-3.0    APTT [[id number]]  (Abnormal) Collected: 01/15/25 1738    Lab Status: Final result Specimen: Blood from Arm, Right Updated: 01/15/25 1811     PTT 37 seconds     HS Troponin 0hr (reflex protocol) [[id number]]  (Normal) Collected: 01/15/25 1738    Lab Status: Final result Specimen: Blood from Arm, Right Updated: 01/15/25 1807     hs TnI 0hr 15 ng/L     Comprehensive metabolic panel [[id number]]  (Abnormal) Collected: 01/15/25 1738    Lab Status: Final result Specimen: Blood from Arm, Right Updated: 01/15/25 1802     Sodium 141 mmol/L      Potassium 3.1 mmol/L      Chloride 101 mmol/L      CO2 29 mmol/L      ANION GAP 11 mmol/L      BUN 21 mg/dL      Creatinine 1.40  mg/dL      Glucose 94 mg/dL      Calcium 9.4 mg/dL      AST 29 U/L      ALT 20 U/L      Alkaline Phosphatase 53 U/L      Total Protein 6.9 g/dL      Albumin 4.2 g/dL      Total Bilirubin 0.69 mg/dL      eGFR 32 ml/min/1.73sq m     Narrative:      [employer] guidelines for Chronic Kidney Disease (CKD):     Stage 1 with normal or high GFR (GFR > 90 mL/min/1.73 square meters)    Stage 2 Mild CKD (GFR = 60-89 mL/min/1.73 square meters)    Stage 3A Moderate CKD (GFR = 45-59 mL/min/1.73 square meters)    Stage 3B Moderate CKD (GFR = 30-44 mL/min/1.73 square meters)    Stage 4 Severe CKD (GFR = 15-29 mL/min/1.73 square meters)    Stage 5 End Stage CKD (GFR <15 mL/min/1.73 square meters)  Note: GFR calculation is accurate only with a steady state creatinine    CBC and differential [[id number]]  (Abnormal) Collected: 01/15/25 1738    Lab Status: Final result Specimen: Blood from Arm, Right Updated: 01/15/25 1745     WBC 6.38 Thousand/uL      RBC 3.91 Million/uL      Hemoglobin 12.2 g/dL      Hematocrit 38.6 %      MCV 99 fL      MCH 31.2 pg      MCHC 31.6 g/dL      RDW 14.6 %      MPV 9.5 fL      Platelets 193 Thousands/uL      nRBC 0 /100 WBCs      Segmented % 67 %      Immature Grans % 1 %      Lymphocytes % 20 %      Monocytes % 11 %      Eosinophils Relative 1 %      Basophils Relative 0 %      Absolute Neutrophils 4.33 Thousands/µL      Absolute Immature Grans 0.04 Thousand/uL      Absolute Lymphocytes 1.29 Thousands/µL      Absolute Monocytes 0.67 Thousand/µL      Eosinophils Absolute 0.04 Thousand/µL      Basophils Absolute 0.01 Thousands/µL             CT head without contrast   Final Interpretation by Jacob Gresham MD (01/15 2036)      No acute intracranial abnormality.  Chronic microangiopathic changes.                  Workstation performed: [id number]         XR chest 1 view portable   ED Interpretation by Jose Yuan DO (01/15 1929)   No acute abnl, no ptx, effusion, infiltrate, no obvious  rib fracture, no widened mediastinum.  Interpreted by me                  ECG 12 Lead Documentation Only    Date/Time: 1/15/2025 9:15 PM    Performed by: Jose Yuan DO  Authorized by: Jose Yuan DO    Indications / Diagnosis:  Dizziness  ECG reviewed by me, the ED Provider: yes    Patient location:  ED  Previous ECG:     Previous ECG:  Unavailable  Interpretation:     Interpretation: normal    Rate:     ECG rate:  60    ECG rate assessment: normal    Rhythm:     Rhythm: paced    Pacing:     Type of pacing:  AV  Ectopy:     Ectopy: none    QRS:     QRS intervals:  Wide  Conduction:     Conduction: normal    ST segments:     ST segments:  Normal  T waves:     T waves: normal    Comments:      This EKG was interpreted by me.      ED Medication and Procedure Management   Prior to Admission Medications   Prescriptions Last Dose Informant Patient Reported? Taking?   Ascorbic Acid (Vitamin C) 500 MG CAPS 1/15/2025  Yes Yes   Sig: Take 500 mg by mouth in the morning   LORazepam (ATIVAN) 1 mg tablet 1/15/2025  Yes Yes   Sig: Take 0.5 mg by mouth 2 (two) times a day   acetaminophen (TYLENOL) 325 mg tablet 1/15/2025  No Yes   Sig: Take 2 tablets (650 mg total) by mouth 3 (three) times a day as needed for mild pain.   albuterol (PROVENTIL HFA,VENTOLIN HFA) 90 mcg/act inhaler 1/15/2025  Yes Yes   Sig: Inhale 2 puffs every 6 (six) hours as needed for wheezing   amLODIPine (NORVASC) 5 mg tablet 1/15/2025  Yes Yes   Sig: Take 5 mg by mouth daily   benzonatate (TESSALON PERLES) 100 mg capsule 1/15/2025  No Yes   Sig: Take 1 capsule (100 mg total) by mouth 3 (three) times a day as needed for cough   bisoprolol (ZEBETA) 10 MG tablet 1/15/2025 Self Yes Yes   Sig: Take 5 mg by mouth daily     chlorthalidone 25 mg tablet 1/15/2025  Yes Yes   Sig: Take 25 mg by mouth daily   gabapentin (NEURONTIN) 100 mg capsule 1/15/2025  Yes Yes   Sig: Take 100 mg by mouth daily at bedtime   methenamine hippurate (HIPREX) 1 g tablet 1/15/2025  Yes  Yes   Sig: Take 1 g by mouth 2 (two) times a day with meals   pravastatin (PRAVACHOL) 20 mg tablet 1/15/2025  Yes Yes   Sig: Take 20 mg by mouth daily   warfarin (COUMADIN) 2 mg tablet Past Week  No Yes   Sig: Take 2 tablets (4 mg total) by mouth daily 4mg Mon, Tues, Wed, Fri, Sat Sun   3mg Thursday      Facility-Administered Medications: None     Discharge Medication List as of 1/15/2025  9:34 PM        START taking these medications    Details   magnesium gluconate (MAGONATE) 500 mg tablet Take 1 tablet (500 mg total) by mouth 2 (two) times a day for 14 days, Starting Wed 1/15/2025, Until Wed 1/29/2025, Normal      potassium chloride (Klor-Con M10) 10 mEq tablet Take 1 tablet (10 mEq total) by mouth 2 (two) times a day, Starting Wed 1/15/2025, Normal           CONTINUE these medications which have NOT CHANGED    Details   acetaminophen (TYLENOL) 325 mg tablet Take 2 tablets (650 mg total) by mouth 3 (three) times a day as needed for mild pain., Starting 1/25/2016, Until Discontinued, Normal      albuterol (PROVENTIL HFA,VENTOLIN HFA) 90 mcg/act inhaler Inhale 2 puffs every 6 (six) hours as needed for wheezing, Historical Med      amLODIPine (NORVASC) 5 mg tablet Take 5 mg by mouth daily, Historical Med      Ascorbic Acid (Vitamin C) 500 MG CAPS Take 500 mg by mouth in the morning, Historical Med      benzonatate (TESSALON PERLES) 100 mg capsule Take 1 capsule (100 mg total) by mouth 3 (three) times a day as needed for cough, Starting Sun 1/16/2022, Normal      bisoprolol (ZEBETA) 10 MG tablet Take 5 mg by mouth daily  , Historical Med      chlorthalidone 25 mg tablet Take 25 mg by mouth daily, Historical Med      gabapentin (NEURONTIN) 100 mg capsule Take 100 mg by mouth daily at bedtime, Historical Med      LORazepam (ATIVAN) 1 mg tablet Take 0.5 mg by mouth 2 (two) times a day, Historical Med      methenamine hippurate (HIPREX) 1 g tablet Take 1 g by mouth 2 (two) times a day with meals, Starting Fri 6/14/2024,  Historical Med      pravastatin (PRAVACHOL) 20 mg tablet Take 20 mg by mouth daily, Historical Med      warfarin (COUMADIN) 2 mg tablet Take 2 tablets (4 mg total) by mouth daily 4mg Mon, Tues, Wed, Fri, Sat Sun   3mg Thursday, Starting Mon 7/1/2024, No Print           No discharge procedures on file.  ED SEPSIS DOCUMENTATION   Time reflects when diagnosis was documented in both MDM as applicable and the Disposition within this note       Time User Action Codes Description Comment    1/15/2025  9:32 PM Jose Yuan [R42] Lightheadedness     1/15/2025  9:32 PM Jose Yuan [W19.XXXA] Fall, initial encounter     1/15/2025  9:32 PM Jose Yuan [K52.9] Enteritis     1/15/2025  9:32 PM Jose Yuan [E87.6] Hypokalemia     1/15/2025  9:32 PM Jose Yuan [E83.42] Hypomagnesemia                  Jose Yuan DO  01/15/25 [id number]
